# Patient Record
Sex: MALE | Race: WHITE | Employment: FULL TIME | URBAN - METROPOLITAN AREA
[De-identification: names, ages, dates, MRNs, and addresses within clinical notes are randomized per-mention and may not be internally consistent; named-entity substitution may affect disease eponyms.]

---

## 2017-08-29 DIAGNOSIS — K21.00 REFLUX ESOPHAGITIS: ICD-10-CM

## 2017-08-29 DIAGNOSIS — I10 ESSENTIAL HYPERTENSION WITH GOAL BLOOD PRESSURE LESS THAN 140/90: ICD-10-CM

## 2017-08-29 RX ORDER — OMEPRAZOLE 20 MG/1
20 CAPSULE, DELAYED RELEASE ORAL
Qty: 90 CAPSULE | Refills: 0 | Status: SHIPPED | OUTPATIENT
Start: 2017-08-29 | End: 2017-12-16

## 2017-08-29 RX ORDER — AMLODIPINE BESYLATE 10 MG/1
10 TABLET ORAL
Qty: 90 TABLET | Refills: 0 | Status: SHIPPED | OUTPATIENT
Start: 2017-08-29 | End: 2017-11-23

## 2017-08-29 NOTE — TELEPHONE ENCOUNTER
Medication(s) to Refill:   Pending Prescriptions Disp Refills    OMEPRAZOLE 20 MG Oral Capsule Delayed Release [Pharmacy Med Name: OMEPRAZOLE DR 20 MG CAPSULE] 90 capsule 2     Sig: TAKE 1 CAPSULE (20 MG TOTAL) BY MOUTH ONCE DAILY.       AMLODIPINE BESYLATE

## 2017-08-29 NOTE — TELEPHONE ENCOUNTER
Per pt last visit he had labs done through screening at work in 2016, said would fax us a copy. Did he do that. I did not see them scanned in chart.

## 2017-09-25 DIAGNOSIS — R09.82 POST-NASAL DRAINAGE: ICD-10-CM

## 2017-09-26 RX ORDER — MONTELUKAST SODIUM 10 MG/1
10 TABLET ORAL DAILY
Qty: 90 TABLET | Refills: 1 | Status: SHIPPED | OUTPATIENT
Start: 2017-09-26 | End: 2018-07-27 | Stop reason: ALTCHOICE

## 2017-10-25 ENCOUNTER — LAB ENCOUNTER (OUTPATIENT)
Dept: LAB | Age: 57
End: 2017-10-25
Attending: PHYSICIAN ASSISTANT
Payer: COMMERCIAL

## 2017-10-25 ENCOUNTER — OFFICE VISIT (OUTPATIENT)
Dept: INTERNAL MEDICINE CLINIC | Facility: CLINIC | Age: 57
End: 2017-10-25

## 2017-10-25 VITALS
RESPIRATION RATE: 17 BRPM | HEIGHT: 66 IN | BODY MASS INDEX: 36.48 KG/M2 | SYSTOLIC BLOOD PRESSURE: 124 MMHG | WEIGHT: 227 LBS | DIASTOLIC BLOOD PRESSURE: 68 MMHG | OXYGEN SATURATION: 98 % | HEART RATE: 72 BPM

## 2017-10-25 DIAGNOSIS — Z79.899 MEDICATION MANAGEMENT: ICD-10-CM

## 2017-10-25 DIAGNOSIS — Z12.5 PROSTATE CANCER SCREENING: ICD-10-CM

## 2017-10-25 DIAGNOSIS — R06.00 DYSPNEA, UNSPECIFIED TYPE: ICD-10-CM

## 2017-10-25 DIAGNOSIS — R97.20 ELEVATED PSA: ICD-10-CM

## 2017-10-25 DIAGNOSIS — I10 ESSENTIAL HYPERTENSION: ICD-10-CM

## 2017-10-25 DIAGNOSIS — K21.9 GASTROESOPHAGEAL REFLUX DISEASE, ESOPHAGITIS PRESENCE NOT SPECIFIED: ICD-10-CM

## 2017-10-25 DIAGNOSIS — Z00.00 ANNUAL PHYSICAL EXAM: Primary | ICD-10-CM

## 2017-10-25 DIAGNOSIS — Z13.0 SCREENING FOR BLOOD DISEASE: ICD-10-CM

## 2017-10-25 DIAGNOSIS — R53.83 MALAISE AND FATIGUE: ICD-10-CM

## 2017-10-25 DIAGNOSIS — R53.81 MALAISE AND FATIGUE: ICD-10-CM

## 2017-10-25 PROCEDURE — 99396 PREV VISIT EST AGE 40-64: CPT | Performed by: PHYSICIAN ASSISTANT

## 2017-10-25 PROCEDURE — G0103 PSA SCREENING: HCPCS | Performed by: PHYSICIAN ASSISTANT

## 2017-10-25 PROCEDURE — 85025 COMPLETE CBC W/AUTO DIFF WBC: CPT | Performed by: PHYSICIAN ASSISTANT

## 2017-10-25 PROCEDURE — 80053 COMPREHEN METABOLIC PANEL: CPT | Performed by: PHYSICIAN ASSISTANT

## 2017-10-25 NOTE — PROGRESS NOTES
Patient presents with:  Physical: breathing is heavy,.. RM 6       HPI:  Pt presents for annual physical.  He is c/p a feeling of \"heaviness to his breathing. \"  Presents every day and denies any relation to exertion.   Pt walks and even jogs at times and • Unspecified fall     acute rt biceps femoris muscle strain     Past Surgical History:  1/26/2011: COLONOSCOPY      Comment: Selma Restrepo MD  1/26/2011: ESOPHAGOSCOPY,BIOPSY      Comment: irregular z-line w/ 1 c proximal displacement                an present. Cardiovascular: Normal rate, regular rhythm and intact distal pulses. No murmur, rubs or gallops. Pulmonary/Chest: Effort normal and breath sounds normal. No respiratory distress. No wheezes, rhonchi or rales  Abdominal: Soft.  Bowel sounds ar

## 2017-11-23 DIAGNOSIS — I10 ESSENTIAL HYPERTENSION WITH GOAL BLOOD PRESSURE LESS THAN 140/90: ICD-10-CM

## 2017-11-27 RX ORDER — AMLODIPINE BESYLATE 10 MG/1
10 TABLET ORAL
Qty: 90 TABLET | Refills: 1 | Status: SHIPPED | OUTPATIENT
Start: 2017-11-27 | End: 2018-05-20

## 2017-12-16 DIAGNOSIS — K21.00 REFLUX ESOPHAGITIS: ICD-10-CM

## 2017-12-18 NOTE — TELEPHONE ENCOUNTER
Lov: 10/25/2017   Upcoming Appt: None   Last Labs: 10/31/2017 Lipids  Last Refill: 8/29/2017 , 90 caps, 0 refill    Route to provider per protocol.

## 2017-12-18 NOTE — TELEPHONE ENCOUNTER
Please call pt. We were going to change him to Pepcid OTC. Has he done that? Was this an auto refill?

## 2017-12-20 RX ORDER — OMEPRAZOLE 20 MG/1
20 CAPSULE, DELAYED RELEASE ORAL
Qty: 30 CAPSULE | Refills: 1 | Status: SHIPPED | OUTPATIENT
Start: 2017-12-20 | End: 2018-01-18

## 2017-12-20 NOTE — TELEPHONE ENCOUNTER
Patient stated he will try Pepcid OTC, however would like the Omeprazole refilled as back up for the case that Pepcid does not work out for him .

## 2018-01-18 DIAGNOSIS — K21.00 REFLUX ESOPHAGITIS: ICD-10-CM

## 2018-01-18 RX ORDER — OMEPRAZOLE 20 MG/1
20 CAPSULE, DELAYED RELEASE ORAL
Qty: 90 CAPSULE | Refills: 1 | Status: SHIPPED | OUTPATIENT
Start: 2018-01-18 | End: 2018-02-14

## 2018-01-18 NOTE — TELEPHONE ENCOUNTER
LOV: 10/25/17  Future office visit: No upcoming visit   Last labs: 10/25/17 Cmp Psa Cbc   Last RX: 12/2-0/17 #30 #1 Refill  Per protocol: Route to provider

## 2018-02-14 DIAGNOSIS — K21.00 REFLUX ESOPHAGITIS: ICD-10-CM

## 2018-02-14 RX ORDER — OMEPRAZOLE 20 MG/1
20 CAPSULE, DELAYED RELEASE ORAL
Qty: 30 CAPSULE | Refills: 1 | Status: SHIPPED | OUTPATIENT
Start: 2018-02-14 | End: 2018-07-27

## 2018-02-14 NOTE — TELEPHONE ENCOUNTER
LOV: 10/25/17  Future office visit: No upcoming visit  Last labs: 10/25/17 Cmp Psa Cbc  Last RX: Omeprazole 1/18/18 #90 #1 Refills  Per protocol: Route to provider

## 2018-05-20 DIAGNOSIS — I10 ESSENTIAL HYPERTENSION WITH GOAL BLOOD PRESSURE LESS THAN 140/90: ICD-10-CM

## 2018-05-21 RX ORDER — AMLODIPINE BESYLATE 10 MG/1
10 TABLET ORAL
Qty: 90 TABLET | Refills: 0 | Status: SHIPPED | OUTPATIENT
Start: 2018-05-21 | End: 2018-08-15

## 2018-06-15 NOTE — TELEPHONE ENCOUNTER
Future Appointments  Date Time Provider Aleyda Dowell   6/20/2018 1:00 PM Medhatb RENEE Orellana [facility] IM

## 2018-06-21 ENCOUNTER — OFFICE VISIT (OUTPATIENT)
Dept: INTERNAL MEDICINE CLINIC | Facility: CLINIC | Age: 58
End: 2018-06-21

## 2018-06-21 VITALS
RESPIRATION RATE: 14 BRPM | TEMPERATURE: 98 F | DIASTOLIC BLOOD PRESSURE: 74 MMHG | WEIGHT: 232 LBS | HEIGHT: 66 IN | HEART RATE: 68 BPM | SYSTOLIC BLOOD PRESSURE: 124 MMHG | BODY MASS INDEX: 37.28 KG/M2

## 2018-06-21 DIAGNOSIS — K21.00 GASTROESOPHAGEAL REFLUX DISEASE WITH ESOPHAGITIS: ICD-10-CM

## 2018-06-21 DIAGNOSIS — E66.9 OBESITY (BMI 30-39.9): ICD-10-CM

## 2018-06-21 DIAGNOSIS — R06.00 DYSPNEA, UNSPECIFIED TYPE: ICD-10-CM

## 2018-06-21 DIAGNOSIS — I10 ESSENTIAL HYPERTENSION WITH GOAL BLOOD PRESSURE LESS THAN 140/90: Primary | ICD-10-CM

## 2018-06-21 PROCEDURE — 99214 OFFICE O/P EST MOD 30 MIN: CPT | Performed by: PHYSICIAN ASSISTANT

## 2018-06-21 NOTE — PROGRESS NOTES
Patient presents with:  Medication Follow-Up: AB RM 6       HPI:  Pt presents for follow up of HTN, SON, GERD. GERD - Pt did not tolerate change to pepcid.   He has been taking Omeprazole only when needed, that has been about every other day as he is havin Physical Exam  /74   Pulse 68   Temp 97.8 °F (36.6 °C) (Oral)   Resp 14   Ht 66\"   Wt 232 lb   BMI 37.45 kg/m²   Constitutional:  No distress. HEENT:  Normocephalic and atraumatic.  Tympanic membranes normal.  Nose normal. Oropharynx is clear having.

## 2018-06-26 ENCOUNTER — TELEPHONE (OUTPATIENT)
Dept: INTERNAL MEDICINE CLINIC | Facility: CLINIC | Age: 58
End: 2018-06-26

## 2018-06-27 NOTE — TELEPHONE ENCOUNTER
Pt's EGD and pathology from 1/26/11 reviewed (from paper chart). Pathology DID NOT show Alvarenga's esophagus. Please inform pt. I would like him to follow up with GI if his GERD sxs are not completely controlled once back on Omeprazole.    If he has any q

## 2018-08-15 DIAGNOSIS — I10 ESSENTIAL HYPERTENSION WITH GOAL BLOOD PRESSURE LESS THAN 140/90: ICD-10-CM

## 2018-08-15 RX ORDER — AMLODIPINE BESYLATE 10 MG/1
TABLET ORAL
Qty: 90 TABLET | Refills: 0 | Status: SHIPPED | OUTPATIENT
Start: 2018-08-15 | End: 2018-11-11

## 2018-10-25 ENCOUNTER — OFFICE VISIT (OUTPATIENT)
Dept: INTERNAL MEDICINE CLINIC | Facility: CLINIC | Age: 58
End: 2018-10-25
Payer: COMMERCIAL

## 2018-10-25 VITALS
WEIGHT: 218.81 LBS | TEMPERATURE: 98 F | HEART RATE: 84 BPM | RESPIRATION RATE: 16 BRPM | BODY MASS INDEX: 37.36 KG/M2 | HEIGHT: 64 IN | SYSTOLIC BLOOD PRESSURE: 122 MMHG | DIASTOLIC BLOOD PRESSURE: 76 MMHG

## 2018-10-25 DIAGNOSIS — Z00.00 ANNUAL PHYSICAL EXAM: Primary | ICD-10-CM

## 2018-10-25 DIAGNOSIS — I10 ESSENTIAL HYPERTENSION WITH GOAL BLOOD PRESSURE LESS THAN 140/90: ICD-10-CM

## 2018-10-25 DIAGNOSIS — G47.9 SLEEP DIFFICULTIES: ICD-10-CM

## 2018-10-25 DIAGNOSIS — K21.00 GASTROESOPHAGEAL REFLUX DISEASE WITH ESOPHAGITIS: ICD-10-CM

## 2018-10-25 PROCEDURE — 99396 PREV VISIT EST AGE 40-64: CPT | Performed by: PHYSICIAN ASSISTANT

## 2018-10-25 NOTE — PROGRESS NOTES
Patient presents with:  Physical: cn room 6: physical no other concerns       HPI:  Pt presents for annual physical.  He denies any new problems today. Pt has seen a dietitian and working on healthier eating, less carbs and more exercise.   Reports he has Procedure Laterality Date   • COLONOSCOPY  1/26/2011    Dimple Arreola MD   • ESOPHAGOSCOPY,BIOPSY  01/26/2011    Final pathology reviewed (paper chart pulled) and NO ELIZABETH'S ESOPHAGUS; irregular z-line w/ 1 c proximal displacement and squamous islands icterus. Neck: Normal range of motion. Neck supple. No carotid bruits bilaterally. No edema present. Cardiovascular: Normal rate, regular rhythm and intact distal pulses. No murmur, rubs or gallops.    Pulmonary/Chest: Effort normal and breath sounds no 10/25/2017 15.3  13.0 - 17.0 g/dL Final   • HCT 10/25/2017 43.9  37.0 - 53.0 % Final   • PLT 10/25/2017 268.0  150.0 - 450.0 10(3)uL Final   • MCV 10/25/2017 89.6  80.0 - 99.0 fL Final   • MCH 10/25/2017 31.2  27.0 - 33.2 pg Final   • MCHC 10/25/2017 34.9 untreated JENNIFER. Pt still declining Sleep study. Agrees to reconsider if sleep does not continue to improve with TLC and wt loss. No orders of the defined types were placed in this encounter.       Meds & Refills for this Visit:  Requested Prescriptions

## 2018-11-11 DIAGNOSIS — I10 ESSENTIAL HYPERTENSION WITH GOAL BLOOD PRESSURE LESS THAN 140/90: ICD-10-CM

## 2018-11-12 RX ORDER — AMLODIPINE BESYLATE 10 MG/1
TABLET ORAL
Qty: 90 TABLET | Refills: 0 | Status: SHIPPED | OUTPATIENT
Start: 2018-11-12 | End: 2019-02-07

## 2018-11-12 NOTE — TELEPHONE ENCOUNTER
LFV: 10/25/2018 with CB  Future Appt: none on file  Last Rx: 8/15/2018 for 90 days 0 refills  Last Labs: 10/25/2017 cbc and cmp  Per protocol to provider

## 2018-11-15 ENCOUNTER — TELEPHONE (OUTPATIENT)
Dept: INTERNAL MEDICINE CLINIC | Facility: CLINIC | Age: 58
End: 2018-11-15

## 2018-11-15 DIAGNOSIS — I10 ESSENTIAL HYPERTENSION: Primary | ICD-10-CM

## 2018-11-15 DIAGNOSIS — Z13.0 SCREENING FOR BLOOD DISEASE: ICD-10-CM

## 2018-11-15 DIAGNOSIS — Z12.5 PROSTATE CANCER SCREENING: ICD-10-CM

## 2018-11-16 NOTE — TELEPHONE ENCOUNTER
He needs to have CBC, CMP and PSA done. I ordered them. Please verify with him what lab he will use and that the orders are correct to the right place. The labs that were in my box have already been scanned in to Epic, this was a duplicate copy.

## 2018-11-19 NOTE — TELEPHONE ENCOUNTER
Pt returned call re: lab results. Pt said he had labs done in 8/2018 - unable to locate, collected outside THE [facility]. Last labs scanned in are from 8/2017. Pt said he needs to call back and let us know which lab he wants to use.

## 2019-01-22 ENCOUNTER — APPOINTMENT (OUTPATIENT)
Dept: GENERAL RADIOLOGY | Facility: HOSPITAL | Age: 59
End: 2019-01-22
Attending: EMERGENCY MEDICINE
Payer: COMMERCIAL

## 2019-01-22 ENCOUNTER — HOSPITAL ENCOUNTER (EMERGENCY)
Facility: HOSPITAL | Age: 59
Discharge: HOME | End: 2019-01-22
Attending: EMERGENCY MEDICINE
Payer: COMMERCIAL

## 2019-01-22 VITALS
WEIGHT: 200 LBS | DIASTOLIC BLOOD PRESSURE: 65 MMHG | TEMPERATURE: 99 F | BODY MASS INDEX: 33.32 KG/M2 | HEIGHT: 65 IN | HEART RATE: 64 BPM | SYSTOLIC BLOOD PRESSURE: 113 MMHG | RESPIRATION RATE: 18 BRPM | OXYGEN SATURATION: 96 %

## 2019-01-22 DIAGNOSIS — M23.92 INTERNAL DERANGEMENT OF LEFT KNEE: Primary | ICD-10-CM

## 2019-01-22 PROBLEM — M17.12 PRIMARY OSTEOARTHRITIS OF LEFT KNEE: Status: ACTIVE | Noted: 2019-01-22

## 2019-01-22 PROCEDURE — 73562 X-RAY EXAM OF KNEE 3: CPT | Performed by: EMERGENCY MEDICINE

## 2019-01-22 PROCEDURE — 99284 EMERGENCY DEPT VISIT MOD MDM: CPT

## 2019-01-22 PROCEDURE — 99283 EMERGENCY DEPT VISIT LOW MDM: CPT

## 2019-01-22 NOTE — ED INITIAL ASSESSMENT (HPI)
Arrives with c/o left leg pain for the last 4-5 days. No injury. No recent long car or plane trips. States pain increased to the point that was unable to bear weight when attempting to go to work this morning.

## 2019-01-22 NOTE — ED PROVIDER NOTES
Patient Seen in: [facility] Emergency Department    History   Patient presents with:  Lower Extremity Injury (musculoskeletal)    Stated Complaint: left leg pain for 3-4 days.     HPI    Jamaal Orta is a 49-year-old gentleman presenting to the emergency Exam  Alert and oriented patient appears no distress HEENT exam is normal lungs are clear cardiovascular exam shows regular rhythm abdomen soft nontender symmetrical cyanosis edema left lower extremity small joint effusion no patellar apprehension no laxit

## 2019-02-07 ENCOUNTER — TELEPHONE (OUTPATIENT)
Dept: INTERNAL MEDICINE CLINIC | Facility: CLINIC | Age: 59
End: 2019-02-07

## 2019-02-07 DIAGNOSIS — I10 ESSENTIAL HYPERTENSION WITH GOAL BLOOD PRESSURE LESS THAN 140/90: ICD-10-CM

## 2019-02-07 RX ORDER — AMLODIPINE BESYLATE 10 MG/1
TABLET ORAL
Qty: 30 TABLET | Refills: 0 | Status: SHIPPED | OUTPATIENT
Start: 2019-02-07 | End: 2019-03-20

## 2019-02-08 NOTE — TELEPHONE ENCOUNTER
I refilled for 30 days as I cannot find any labs since 2017. Please remind him to do labs we ordered in November.

## 2019-03-20 ENCOUNTER — TELEPHONE (OUTPATIENT)
Dept: INTERNAL MEDICINE CLINIC | Facility: CLINIC | Age: 59
End: 2019-03-20

## 2019-03-20 DIAGNOSIS — Z13.0 SCREENING FOR DISORDER OF BLOOD AND BLOOD-FORMING ORGANS: ICD-10-CM

## 2019-03-20 DIAGNOSIS — I10 ESSENTIAL HYPERTENSION: Primary | ICD-10-CM

## 2019-03-20 LAB
ABSOLUTE BASOPHILS: 94 CELLS/UL (ref 0–200)
ABSOLUTE EOSINOPHILS: 51 CELLS/UL (ref 15–500)
ABSOLUTE LYMPHOCYTES: 2491 CELLS/UL (ref 850–3900)
ABSOLUTE MONOCYTES: 714 CELLS/UL (ref 200–950)
ABSOLUTE NEUTROPHILS: 5151 CELLS/UL (ref 1500–7800)
ALBUMIN/GLOBULIN RATIO: 1.3 (CALC) (ref 1–2.5)
ALBUMIN: 4 G/DL (ref 3.6–5.1)
ALKALINE PHOSPHATASE: 59 U/L (ref 40–115)
ALT: 14 U/L (ref 9–46)
AST: 16 U/L (ref 10–35)
BASOPHILS: 1.1 %
BILIRUBIN, TOTAL: 0.5 MG/DL (ref 0.2–1.2)
BUN: 18 MG/DL (ref 7–25)
CALCIUM: 9.1 MG/DL (ref 8.6–10.3)
CARBON DIOXIDE: 29 MMOL/L (ref 20–32)
CHLORIDE: 100 MMOL/L (ref 98–110)
CREATININE: 0.96 MG/DL (ref 0.7–1.25)
EGFR IF AFRICN AM: 98 ML/MIN/1.73M2
EGFR IF NONAFRICN AM: 85 ML/MIN/1.73M2
EOSINOPHILS: 0.6 %
GLOBULIN: 3 G/DL (CALC) (ref 1.9–3.7)
GLUCOSE: 99 MG/DL (ref 65–99)
HEMATOCRIT: 44.8 % (ref 38.5–50)
HEMOGLOBIN: 15.4 G/DL (ref 13.2–17.1)
LYMPHOCYTES: 29.3 %
MCH: 30.3 PG (ref 27–33)
MCHC: 34.4 G/DL (ref 32–36)
MCV: 88.2 FL (ref 80–100)
MONOCYTES: 8.4 %
MPV: 9.9 FL (ref 7.5–12.5)
NEUTROPHILS: 60.6 %
PLATELET COUNT: 314 THOUSAND/UL (ref 140–400)
POTASSIUM: 4.4 MMOL/L (ref 3.5–5.3)
PROTEIN, TOTAL: 7 G/DL (ref 6.1–8.1)
RDW: 12.7 % (ref 11–15)
RED BLOOD CELL COUNT: 5.08 MILLION/UL (ref 4.2–5.8)
SODIUM: 137 MMOL/L (ref 135–146)
WHITE BLOOD CELL COUNT: 8.5 THOUSAND/UL (ref 3.8–10.8)

## 2019-03-20 RX ORDER — AMLODIPINE BESYLATE 10 MG/1
10 TABLET ORAL
Qty: 90 TABLET | Refills: 0 | Status: SHIPPED | OUTPATIENT
Start: 2019-03-20 | End: 2019-06-10

## 2019-03-20 NOTE — TELEPHONE ENCOUNTER
Pt on his way to do his labs at [address] [place] [address]. Is asking if the refill can be done for 90 pls?

## 2019-04-23 PROBLEM — R60.9 PITTING EDEMA: Status: ACTIVE | Noted: 2019-04-23

## 2019-04-23 PROBLEM — M79.89 CALF SWELLING: Status: ACTIVE | Noted: 2019-04-23

## 2019-04-23 PROBLEM — M23.92 INTERNAL DERANGEMENT OF KNEE, LEFT: Status: ACTIVE | Noted: 2019-04-23

## 2019-04-23 PROBLEM — M25.462 EFFUSION OF KNEE JOINT, LEFT: Status: ACTIVE | Noted: 2019-04-23

## 2019-05-14 PROBLEM — S83.242A ACUTE MEDIAL MENISCUS TEAR OF LEFT KNEE: Status: ACTIVE | Noted: 2019-05-14

## 2019-06-10 DIAGNOSIS — I10 ESSENTIAL HYPERTENSION WITH GOAL BLOOD PRESSURE LESS THAN 140/90: ICD-10-CM

## 2019-06-10 RX ORDER — AMLODIPINE BESYLATE 10 MG/1
TABLET ORAL
Qty: 90 TABLET | Refills: 0 | Status: SHIPPED | OUTPATIENT
Start: 2019-06-10 | End: 2019-09-05

## 2019-06-10 NOTE — TELEPHONE ENCOUNTER
Protocol failed due to Appointment in past 6 or next 3 months    Please advise,    LOV:10/25/18 CB  FOV:10/30/19   LAST RX: 3/20/19 10 mg take 1 tab daily 90 tabs 0 refills   LAST LABS:3/20/19 cmp,cbc  PER PROTOCOL: to provider

## 2019-06-12 NOTE — TELEPHONE ENCOUNTER
Future Appointments   Date Time Provider Aleyda Dowell   10/30/2019  7:30 AM Citlali Cutler PA-C [facility] IM

## 2019-09-05 DIAGNOSIS — I10 ESSENTIAL HYPERTENSION WITH GOAL BLOOD PRESSURE LESS THAN 140/90: ICD-10-CM

## 2019-09-05 RX ORDER — AMLODIPINE BESYLATE 10 MG/1
TABLET ORAL
Qty: 90 TABLET | Refills: 0 | Status: SHIPPED | OUTPATIENT
Start: 2019-09-05 | End: 2019-12-03

## 2019-09-05 NOTE — TELEPHONE ENCOUNTER
Last Ov: 10/25/18, CB, physical  Upcoming appt: 10/30/19  Last labs: CBC, CMP 3/20/19  Last Rx: amlodipine 10mg, #90, 0R 6/10/19    Per Protocol, passed.  Refill sent

## 2019-10-30 ENCOUNTER — OFFICE VISIT (OUTPATIENT)
Dept: INTERNAL MEDICINE CLINIC | Facility: CLINIC | Age: 59
End: 2019-10-30
Payer: COMMERCIAL

## 2019-10-30 VITALS
HEART RATE: 68 BPM | TEMPERATURE: 99 F | RESPIRATION RATE: 16 BRPM | HEIGHT: 64 IN | SYSTOLIC BLOOD PRESSURE: 122 MMHG | WEIGHT: 206 LBS | OXYGEN SATURATION: 100 % | DIASTOLIC BLOOD PRESSURE: 74 MMHG | BODY MASS INDEX: 35.17 KG/M2

## 2019-10-30 DIAGNOSIS — E66.9 CLASS 2 OBESITY WITHOUT SERIOUS COMORBIDITY WITH BODY MASS INDEX (BMI) OF 35.0 TO 35.9 IN ADULT, UNSPECIFIED OBESITY TYPE: ICD-10-CM

## 2019-10-30 DIAGNOSIS — Z13.0 SCREENING FOR BLOOD DISEASE: ICD-10-CM

## 2019-10-30 DIAGNOSIS — Z13.29 THYROID DISORDER SCREEN: ICD-10-CM

## 2019-10-30 DIAGNOSIS — Z13.220 LIPID SCREENING: ICD-10-CM

## 2019-10-30 DIAGNOSIS — I10 ESSENTIAL HYPERTENSION WITH GOAL BLOOD PRESSURE LESS THAN 140/90: ICD-10-CM

## 2019-10-30 DIAGNOSIS — Z12.5 PROSTATE CANCER SCREENING: ICD-10-CM

## 2019-10-30 DIAGNOSIS — Z00.00 ANNUAL PHYSICAL EXAM: Primary | ICD-10-CM

## 2019-10-30 PROCEDURE — 99396 PREV VISIT EST AGE 40-64: CPT | Performed by: PHYSICIAN ASSISTANT

## 2019-10-30 NOTE — PROGRESS NOTES
Patient presents with:  Physical: RG rm 6 Physical      HPI:   Pt presents for annual physical.  He denies any new problems. Has kept the weight he lost off but has not lost any more. Has had L knee problems over the last year. Seeing Xochitl.   Consider History:   Procedure Laterality Date   • COLONOSCOPY  1/26/2011    Erna Valencia MD   • ESOPHAGOSCOPY,BIOPSY  01/26/2011    Final pathology reviewed (paper chart pulled) and NO ELIZABETH'S ESOPHAGUS; irregular z-line w/ 1 c proximal displacement and squamou Normal range of motion. Neck supple. No carotid bruits bilaterally. No edema present. Cardiovascular: Normal rate, regular rhythm and intact distal pulses. No murmur, rubs or gallops.    Pulmonary/Chest: Effort normal and breath sounds normal. No respirat

## 2019-11-16 ENCOUNTER — LAB ENCOUNTER (OUTPATIENT)
Dept: LAB | Age: 59
End: 2019-11-16
Attending: PHYSICIAN ASSISTANT
Payer: COMMERCIAL

## 2019-11-16 DIAGNOSIS — Z00.00 ANNUAL PHYSICAL EXAM: ICD-10-CM

## 2019-11-16 DIAGNOSIS — Z12.5 PROSTATE CANCER SCREENING: ICD-10-CM

## 2019-11-16 PROCEDURE — 84153 ASSAY OF PSA TOTAL: CPT | Performed by: PHYSICIAN ASSISTANT

## 2019-11-18 DIAGNOSIS — R73.9 HYPERGLYCEMIA: Primary | ICD-10-CM

## 2019-11-18 NOTE — PROGRESS NOTES
Normal A1c.   Please notify pt that though fasting blood sugar was mildly elevated his 3 month average blood sugar was normal.

## 2019-11-18 NOTE — PROGRESS NOTES
Labs are normal/stable except for elevated glucose. Please call and add HBA1c to blood in lab. I placed the order.

## 2019-12-03 DIAGNOSIS — I10 ESSENTIAL HYPERTENSION WITH GOAL BLOOD PRESSURE LESS THAN 140/90: ICD-10-CM

## 2019-12-03 RX ORDER — AMLODIPINE BESYLATE 10 MG/1
TABLET ORAL
Qty: 90 TABLET | Refills: 1 | Status: SHIPPED | OUTPATIENT
Start: 2019-12-03 | End: 2020-06-16

## 2020-06-16 DIAGNOSIS — I10 ESSENTIAL HYPERTENSION WITH GOAL BLOOD PRESSURE LESS THAN 140/90: ICD-10-CM

## 2020-06-16 RX ORDER — AMLODIPINE BESYLATE 10 MG/1
TABLET ORAL
Qty: 90 TABLET | Refills: 0 | Status: SHIPPED | OUTPATIENT
Start: 2020-06-16 | End: 2020-09-10

## 2020-06-16 NOTE — TELEPHONE ENCOUNTER
Last Ov: 10/30/19, CB, CPE  Last labs: A1c, PSA screen, TSH w Ref, Lipid, CMP, CBC 11/16/19  Last Rx: amlodipine 10mg, #90, 1R 12/3/19    No future appointments. Per Protocol - failed, pt due for appt. Rx pending.

## 2020-06-16 NOTE — TELEPHONE ENCOUNTER
Pt due for f/u of HTN. Please call to schedule. Can do phone visit if he has cuff to check BP at home.   Thx.

## 2020-06-24 NOTE — TELEPHONE ENCOUNTER
Future Appointments   Date Time Provider Aleyda Dowell   6/25/2020  3:00 PM Marylou Moise PA-C [facility]

## 2020-06-25 ENCOUNTER — VIRTUAL PHONE E/M (OUTPATIENT)
Dept: INTERNAL MEDICINE CLINIC | Facility: CLINIC | Age: 60
End: 2020-06-25
Payer: COMMERCIAL

## 2020-06-25 VITALS — DIASTOLIC BLOOD PRESSURE: 63 MMHG | SYSTOLIC BLOOD PRESSURE: 122 MMHG

## 2020-06-25 DIAGNOSIS — I10 ESSENTIAL HYPERTENSION WITH GOAL BLOOD PRESSURE LESS THAN 140/90: Primary | ICD-10-CM

## 2020-06-25 PROCEDURE — 99213 OFFICE O/P EST LOW 20 MIN: CPT | Performed by: PHYSICIAN ASSISTANT

## 2020-09-10 DIAGNOSIS — I10 ESSENTIAL HYPERTENSION WITH GOAL BLOOD PRESSURE LESS THAN 140/90: ICD-10-CM

## 2020-09-10 RX ORDER — AMLODIPINE BESYLATE 10 MG/1
TABLET ORAL
Qty: 90 TABLET | Refills: 0 | Status: SHIPPED | OUTPATIENT
Start: 2020-09-10 | End: 2020-12-09

## 2020-09-10 NOTE — TELEPHONE ENCOUNTER
Last Ov: 6/25/20, CB, virtual visit (BP)  Last labs: A1c, PSA, TSH w Ref, Lipid, CMP, CBC 11/16/19  Last Rx: amlodipine 10mg, #90, 0R 6/16/20    No future appointments. Per Protocol - failed, pt due for appt. Rx pending.

## 2020-12-09 DIAGNOSIS — I10 ESSENTIAL HYPERTENSION WITH GOAL BLOOD PRESSURE LESS THAN 140/90: ICD-10-CM

## 2020-12-09 RX ORDER — AMLODIPINE BESYLATE 10 MG/1
TABLET ORAL
Qty: 30 TABLET | Refills: 0 | Status: SHIPPED | OUTPATIENT
Start: 2020-12-09 | End: 2020-12-17

## 2020-12-09 NOTE — TELEPHONE ENCOUNTER
Last VISIT 10/30/19    Last REFILL 09/10/20 qty 90 w/0 refills    Last LABS No recent labs done    No future appointments. Per PROTOCOL? Failed    Please Approve or Deny.

## 2020-12-17 ENCOUNTER — TELEPHONE (OUTPATIENT)
Dept: INTERNAL MEDICINE CLINIC | Facility: CLINIC | Age: 60
End: 2020-12-17

## 2020-12-17 DIAGNOSIS — Z12.5 PROSTATE CANCER SCREENING: ICD-10-CM

## 2020-12-17 DIAGNOSIS — Z00.00 ANNUAL PHYSICAL EXAM: Primary | ICD-10-CM

## 2020-12-17 DIAGNOSIS — I10 ESSENTIAL HYPERTENSION WITH GOAL BLOOD PRESSURE LESS THAN 140/90: ICD-10-CM

## 2020-12-17 DIAGNOSIS — Z13.0 ENCOUNTER FOR SCREENING FOR DISEASES OF THE BLOOD AND BLOOD-FORMING ORGANS AND CERTAIN DISORDERS INVOLVING THE IMMUNE MECHANISM: ICD-10-CM

## 2020-12-17 DIAGNOSIS — Z13.1 SCREENING FOR DIABETES MELLITUS: ICD-10-CM

## 2020-12-17 DIAGNOSIS — Z13.0 SCREENING FOR BLOOD DISEASE: ICD-10-CM

## 2020-12-17 DIAGNOSIS — Z13.29 THYROID DISORDER SCREEN: ICD-10-CM

## 2020-12-17 DIAGNOSIS — Z13.220 LIPID SCREENING: ICD-10-CM

## 2020-12-17 NOTE — TELEPHONE ENCOUNTER
Future Appointments   Date Time Provider Aleyda Magalys   1/6/2021  1:30 PM Sapphire Alegria PA-C [facility]

## 2020-12-17 NOTE — TELEPHONE ENCOUNTER
Future Appointments   Date Time Provider Aleyda Magalys   1/6/2021  1:30 PM Bruce Izaguirre PA-C [facility]     Patient is scheduled for Annual Physical.  Please place orders with THE [facility] [place] [facility]. Patient aware to fast.  No call back required.   Henrietta

## 2020-12-17 NOTE — TELEPHONE ENCOUNTER
Prescription Refill Request - Patient advised can take 48-72 hours.     Name of Medication (strength, dose, qty requested:     AMLODIPINE BESYLATE 10 MG Oral Tab 30 tablet 0 12/9/2020    Sig:   TAKE 1 TABLET BY MOUTH EVERY DAY     Route:   (none)         Wh

## 2020-12-18 RX ORDER — AMLODIPINE BESYLATE 10 MG/1
10 TABLET ORAL DAILY
Qty: 30 TABLET | Refills: 0 | Status: SHIPPED | OUTPATIENT
Start: 2020-12-18 | End: 2020-12-21

## 2020-12-18 NOTE — TELEPHONE ENCOUNTER
Last VISIT  10/30/19 CB CPE  Last REFILL 12/8/20 Amlodipine 30T 0R  Last LABS  11/16/19 HgA1c, PSA, TSH, Lipid, CMP,    CBC    Future Appointments   Date Time Provider Aleyda Dowell   1/6/2021  1:30 PM Sapphire Alegria PA-C [facility]

## 2020-12-20 DIAGNOSIS — I10 ESSENTIAL HYPERTENSION WITH GOAL BLOOD PRESSURE LESS THAN 140/90: ICD-10-CM

## 2020-12-21 RX ORDER — AMLODIPINE BESYLATE 10 MG/1
TABLET ORAL
Qty: 90 TABLET | Refills: 0 | Status: SHIPPED | OUTPATIENT
Start: 2020-12-21 | End: 2021-02-01

## 2020-12-21 NOTE — TELEPHONE ENCOUNTER
Last Ov: 6/25/20, CB, virtual visit (HTN)  Last labs: CBC, CMP, Lipid, TSH w Ref, PSA, A1c 11/16/19  Last Rx: amlodipine 10mg, #30, 0R 12/18/20    Future Appointments   Date Time Provider Aleyda Dowell   1/6/2021  1:30 PM Emre Mack PA-C [facility]

## 2021-01-06 ENCOUNTER — LAB ENCOUNTER (OUTPATIENT)
Dept: LAB | Age: 61
End: 2021-01-06
Attending: PHYSICIAN ASSISTANT
Payer: COMMERCIAL

## 2021-01-06 DIAGNOSIS — R73.9 HYPERGLYCEMIA: ICD-10-CM

## 2021-01-06 DIAGNOSIS — Z00.00 ANNUAL PHYSICAL EXAM: ICD-10-CM

## 2021-01-06 DIAGNOSIS — R73.9 HYPERGLYCEMIA: Primary | ICD-10-CM

## 2021-01-06 DIAGNOSIS — I10 ESSENTIAL HYPERTENSION WITH GOAL BLOOD PRESSURE LESS THAN 140/90: ICD-10-CM

## 2021-01-06 DIAGNOSIS — Z13.220 LIPID SCREENING: ICD-10-CM

## 2021-01-06 DIAGNOSIS — Z13.0 SCREENING FOR BLOOD DISEASE: ICD-10-CM

## 2021-01-06 DIAGNOSIS — Z13.29 THYROID DISORDER SCREEN: ICD-10-CM

## 2021-01-06 DIAGNOSIS — Z12.5 PROSTATE CANCER SCREENING: ICD-10-CM

## 2021-01-06 LAB
ALBUMIN SERPL-MCNC: 3.5 G/DL (ref 3.4–5)
ALBUMIN/GLOB SERPL: 0.9 {RATIO} (ref 1–2)
ALP LIVER SERPL-CCNC: 62 U/L
ALT SERPL-CCNC: 23 U/L
ANION GAP SERPL CALC-SCNC: 8 MMOL/L (ref 0–18)
AST SERPL-CCNC: 12 U/L (ref 15–37)
BASOPHILS # BLD AUTO: 0.1 X10(3) UL (ref 0–0.2)
BASOPHILS NFR BLD AUTO: 1.3 %
BILIRUB SERPL-MCNC: 0.6 MG/DL (ref 0.1–2)
BUN BLD-MCNC: 17 MG/DL (ref 7–18)
BUN/CREAT SERPL: 19.1 (ref 10–20)
CALCIUM BLD-MCNC: 8.9 MG/DL (ref 8.5–10.1)
CHLORIDE SERPL-SCNC: 105 MMOL/L (ref 98–112)
CHOLEST SMN-MCNC: 221 MG/DL (ref ?–200)
CO2 SERPL-SCNC: 24 MMOL/L (ref 21–32)
COMPLEXED PSA SERPL-MCNC: 1.59 NG/ML (ref ?–4)
CREAT BLD-MCNC: 0.89 MG/DL
DEPRECATED RDW RBC AUTO: 43.4 FL (ref 35.1–46.3)
EOSINOPHIL # BLD AUTO: 0.17 X10(3) UL (ref 0–0.7)
EOSINOPHIL NFR BLD AUTO: 2.1 %
ERYTHROCYTE [DISTWIDTH] IN BLOOD BY AUTOMATED COUNT: 12.9 % (ref 11–15)
EST. AVERAGE GLUCOSE BLD GHB EST-MCNC: 114 MG/DL (ref 68–126)
GLOBULIN PLAS-MCNC: 3.7 G/DL (ref 2.8–4.4)
GLUCOSE BLD-MCNC: 102 MG/DL (ref 70–99)
HBA1C MFR BLD HPLC: 5.6 % (ref ?–5.7)
HCT VFR BLD AUTO: 47 %
HDLC SERPL-MCNC: 77 MG/DL (ref 40–59)
HGB BLD-MCNC: 15.5 G/DL
IMM GRANULOCYTES # BLD AUTO: 0.06 X10(3) UL (ref 0–1)
IMM GRANULOCYTES NFR BLD: 0.8 %
LDLC SERPL CALC-MCNC: 126 MG/DL (ref ?–100)
LYMPHOCYTES # BLD AUTO: 2.88 X10(3) UL (ref 1–4)
LYMPHOCYTES NFR BLD AUTO: 36.4 %
M PROTEIN MFR SERPL ELPH: 7.2 G/DL (ref 6.4–8.2)
MCH RBC QN AUTO: 30.5 PG (ref 26–34)
MCHC RBC AUTO-ENTMCNC: 33 G/DL (ref 31–37)
MCV RBC AUTO: 92.5 FL
MONOCYTES # BLD AUTO: 0.69 X10(3) UL (ref 0.1–1)
MONOCYTES NFR BLD AUTO: 8.7 %
NEUTROPHILS # BLD AUTO: 4.02 X10 (3) UL (ref 1.5–7.7)
NEUTROPHILS # BLD AUTO: 4.02 X10(3) UL (ref 1.5–7.7)
NEUTROPHILS NFR BLD AUTO: 50.7 %
NONHDLC SERPL-MCNC: 144 MG/DL (ref ?–130)
OSMOLALITY SERPL CALC.SUM OF ELEC: 286 MOSM/KG (ref 275–295)
PATIENT FASTING Y/N/NP: YES
PATIENT FASTING Y/N/NP: YES
PLATELET # BLD AUTO: 301 10(3)UL (ref 150–450)
POTASSIUM SERPL-SCNC: 4.4 MMOL/L (ref 3.5–5.1)
RBC # BLD AUTO: 5.08 X10(6)UL
SODIUM SERPL-SCNC: 137 MMOL/L (ref 136–145)
TRIGL SERPL-MCNC: 88 MG/DL (ref 30–149)
TSI SER-ACNC: 1.22 MIU/ML (ref 0.36–3.74)
VLDLC SERPL CALC-MCNC: 18 MG/DL (ref 0–30)
WBC # BLD AUTO: 7.9 X10(3) UL (ref 4–11)

## 2021-01-06 PROCEDURE — 36415 COLL VENOUS BLD VENIPUNCTURE: CPT

## 2021-01-06 PROCEDURE — 85025 COMPLETE CBC W/AUTO DIFF WBC: CPT

## 2021-01-06 PROCEDURE — 80061 LIPID PANEL: CPT

## 2021-01-06 PROCEDURE — 84443 ASSAY THYROID STIM HORMONE: CPT

## 2021-01-06 PROCEDURE — 83036 HEMOGLOBIN GLYCOSYLATED A1C: CPT

## 2021-01-06 PROCEDURE — 80053 COMPREHEN METABOLIC PANEL: CPT

## 2021-01-06 NOTE — PROGRESS NOTES
Labs noted. LDL increasing, gluc mildly elevated. A1c added to blood in lab. Will discuss results with pt at OV next week.

## 2021-01-07 NOTE — PROGRESS NOTES
Normal A1c in setting of elevated fasting glucose. Will discuss with pt at upcoming [address]

## 2021-01-14 ENCOUNTER — OFFICE VISIT (OUTPATIENT)
Dept: INTERNAL MEDICINE CLINIC | Facility: CLINIC | Age: 61
End: 2021-01-14
Payer: COMMERCIAL

## 2021-01-14 ENCOUNTER — LAB ENCOUNTER (OUTPATIENT)
Dept: LAB | Age: 61
End: 2021-01-14
Attending: PHYSICIAN ASSISTANT
Payer: COMMERCIAL

## 2021-01-14 VITALS
SYSTOLIC BLOOD PRESSURE: 134 MMHG | RESPIRATION RATE: 16 BRPM | HEART RATE: 72 BPM | HEIGHT: 64 IN | DIASTOLIC BLOOD PRESSURE: 76 MMHG | WEIGHT: 247 LBS | TEMPERATURE: 98 F | BODY MASS INDEX: 42.17 KG/M2

## 2021-01-14 DIAGNOSIS — Z12.11 COLON CANCER SCREENING: ICD-10-CM

## 2021-01-14 DIAGNOSIS — Z00.00 ANNUAL PHYSICAL EXAM: Primary | ICD-10-CM

## 2021-01-14 DIAGNOSIS — Z23 NEEDS FLU SHOT: ICD-10-CM

## 2021-01-14 DIAGNOSIS — R20.2 PARESTHESIAS: ICD-10-CM

## 2021-01-14 DIAGNOSIS — E78.9 ABNORMAL CHOLESTEROL TEST: ICD-10-CM

## 2021-01-14 DIAGNOSIS — Z23 NEED FOR SHINGLES VACCINE: ICD-10-CM

## 2021-01-14 LAB — VIT B12 SERPL-MCNC: 298 PG/ML (ref 193–986)

## 2021-01-14 PROCEDURE — 3075F SYST BP GE 130 - 139MM HG: CPT | Performed by: PHYSICIAN ASSISTANT

## 2021-01-14 PROCEDURE — 82607 VITAMIN B-12: CPT

## 2021-01-14 PROCEDURE — 90471 IMMUNIZATION ADMIN: CPT | Performed by: PHYSICIAN ASSISTANT

## 2021-01-14 PROCEDURE — 3078F DIAST BP <80 MM HG: CPT | Performed by: PHYSICIAN ASSISTANT

## 2021-01-14 PROCEDURE — 90750 HZV VACC RECOMBINANT IM: CPT | Performed by: PHYSICIAN ASSISTANT

## 2021-01-14 PROCEDURE — 3008F BODY MASS INDEX DOCD: CPT | Performed by: PHYSICIAN ASSISTANT

## 2021-01-14 PROCEDURE — 36415 COLL VENOUS BLD VENIPUNCTURE: CPT

## 2021-01-14 PROCEDURE — 99396 PREV VISIT EST AGE 40-64: CPT | Performed by: PHYSICIAN ASSISTANT

## 2021-01-14 NOTE — PROGRESS NOTES
Patient presents with:  Physical: DD Rm 1, Annual Physical  Vaccinations: Flu shot today      HPI:  Pt presents for annual physical.    Has numbness in L 5th digit X 3 1/2 mos. No inciting event.   Has numbness in R 2nd and 3rd fingers as well but more int medial meniscus tear of left knee     Essential hypertension with goal blood pressure less than 140/90      Past Medical History:   Diagnosis Date   • Unspecified fall     acute rt biceps femoris muscle strain   • Wears glasses      Past Surgical History: Position: Sitting, Cuff Size: large)   Pulse 72   Temp 98.2 °F (36.8 °C) (Temporal)   Resp 16   Ht 5' 4\" (1.626 m)   Wt 247 lb (112 kg)   BMI 42.40 kg/m²   Constitutional: Oriented to person, place, and time. No distress.    HEENT:  Normocephalic and atrau 10.1 mg/dL Final   • Calculated Osmolality 01/06/2021 286  275 - 295 mOsm/kg Final   • GFR, Non- 01/06/2021 92  >=60 Final   • GFR, -American 01/06/2021 106  >=60 Final   • AST 01/06/2021 12* 15 - 37 U/L Final   • ALT 01/06/2021 23 Absolute 01/06/2021 0.10  0.00 - 0.20 x10(3) uL Final   • Immature Granulocyte Absolute 01/06/2021 0.06  0.00 - 1.00 x10(3) uL Final   • Neutrophil % 01/06/2021 50.7  % Final   • Lymphocyte % 01/06/2021 36.4  % Final   • Monocyte % 01/06/2021 8.7  % Final

## 2021-01-15 NOTE — PROGRESS NOTES
B12 on low side of normal.  Would suggest pt start oral B12 supplement 1000 mcg PO daily. Get EMG/NCS as ordered at today's OV.

## 2021-02-01 DIAGNOSIS — I10 ESSENTIAL HYPERTENSION WITH GOAL BLOOD PRESSURE LESS THAN 140/90: ICD-10-CM

## 2021-02-01 RX ORDER — AMLODIPINE BESYLATE 10 MG/1
10 TABLET ORAL DAILY
Qty: 90 TABLET | Refills: 1 | Status: SHIPPED | OUTPATIENT
Start: 2021-02-01 | End: 2021-03-11

## 2021-02-12 ENCOUNTER — TELEPHONE (OUTPATIENT)
Dept: INTERNAL MEDICINE CLINIC | Facility: CLINIC | Age: 61
End: 2021-02-12

## 2021-02-12 DIAGNOSIS — K21.9 GASTROESOPHAGEAL REFLUX DISEASE, UNSPECIFIED WHETHER ESOPHAGITIS PRESENT: Primary | ICD-10-CM

## 2021-02-12 NOTE — TELEPHONE ENCOUNTER
SCOOBY    Pt reports has hx of GERD and heartburn, takes omeprazole. Requesting endoscopy. Informed can talk to  about this at first appt. Verbs understanding.

## 2021-02-12 NOTE — TELEPHONE ENCOUNTER
CB referred pt for colonoscopy to Dr. Bhatt Alert and pt wants to have endoscopy done at the same time-wants us to change the order to include endo please-call to advise

## 2021-02-12 NOTE — TELEPHONE ENCOUNTER
Pt was referred for screening colonoscopy, usually pt does not meet with GI for OV prior to colonoscopy. I will place another referral to [facility] for GERD.

## 2021-02-12 NOTE — TELEPHONE ENCOUNTER
Patient notified to schedule with Dr Jimenez Handing to discuss GERD and screening colonoscopy. Pt verbalizes understanding.

## 2021-03-11 RX ORDER — AMLODIPINE BESYLATE 10 MG/1
10 TABLET ORAL DAILY
Qty: 90 TABLET | Refills: 1 | Status: SHIPPED | OUTPATIENT
Start: 2021-03-11 | End: 2021-10-08

## 2021-03-17 ENCOUNTER — OFFICE VISIT (OUTPATIENT)
Dept: ELECTROPHYSIOLOGY | Facility: HOSPITAL | Age: 61
End: 2021-03-17
Attending: PHYSICIAN ASSISTANT
Payer: COMMERCIAL

## 2021-03-17 DIAGNOSIS — R20.2 PARESTHESIAS: ICD-10-CM

## 2021-03-17 DIAGNOSIS — R20.2 NUMBNESS AND TINGLING IN BOTH HANDS: Primary | ICD-10-CM

## 2021-03-17 DIAGNOSIS — R20.0 NUMBNESS AND TINGLING IN BOTH HANDS: Primary | ICD-10-CM

## 2021-03-17 PROCEDURE — 95886 MUSC TEST DONE W/N TEST COMP: CPT | Performed by: OTHER

## 2021-03-17 PROCEDURE — 95910 NRV CNDJ TEST 7-8 STUDIES: CPT | Performed by: OTHER

## 2021-03-18 NOTE — PROCEDURES
[facility], [address] [place] [address]      PATIENT'S NAME: Ant Laboy   REFERRING PHYSICIAN: Lenward Epley   PATIENT ACCOUNT #: [de-identified] LOCATION: [facility]   MEDICAL RECORD #: [id number] DATE OF BIRTH: [birth year] is no superimposed left lower cervical radiculopathy demonstrated at this time. Clinical correlation is indicated.       Dictated By Cammy Emanuel M.D.  d: 03/17/2021 14:07:14  t: 03/17/2021 14:46:24  [facility] [id number]  /

## 2021-04-02 ENCOUNTER — IMMUNIZATION (OUTPATIENT)
Dept: LAB | Age: 61
End: 2021-04-02
Attending: HOSPITALIST
Payer: COMMERCIAL

## 2021-04-02 DIAGNOSIS — Z23 NEED FOR VACCINATION: Primary | ICD-10-CM

## 2021-04-02 PROCEDURE — 0001A SARSCOV2 VAC 30MCG/0.3ML IM: CPT

## 2021-04-12 ENCOUNTER — LAB ENCOUNTER (OUTPATIENT)
Dept: LAB | Facility: HOSPITAL | Age: 61
End: 2021-04-12
Attending: INTERNAL MEDICINE
Payer: COMMERCIAL

## 2021-04-12 DIAGNOSIS — Z01.818 PREOP TESTING: ICD-10-CM

## 2021-04-15 PROBLEM — K21.9 GERD (GASTROESOPHAGEAL REFLUX DISEASE): Status: ACTIVE | Noted: 2021-04-15

## 2021-04-15 PROBLEM — D12.0 BENIGN NEOPLASM OF CECUM: Status: ACTIVE | Noted: 2021-04-15

## 2021-04-15 PROBLEM — K22.9 IRREGULAR Z LINE OF ESOPHAGUS: Status: ACTIVE | Noted: 2021-04-15

## 2021-04-15 PROBLEM — Z12.11 SPECIAL SCREENING FOR MALIGNANT NEOPLASM OF COLON: Status: ACTIVE | Noted: 2021-04-15

## 2021-04-23 ENCOUNTER — IMMUNIZATION (OUTPATIENT)
Dept: LAB | Age: 61
End: 2021-04-23
Attending: HOSPITALIST
Payer: COMMERCIAL

## 2021-04-23 DIAGNOSIS — Z23 NEED FOR VACCINATION: Primary | ICD-10-CM

## 2021-04-23 PROCEDURE — 0002A SARSCOV2 VAC 30MCG/0.3ML IM: CPT

## 2021-10-04 DIAGNOSIS — I10 ESSENTIAL HYPERTENSION WITH GOAL BLOOD PRESSURE LESS THAN 140/90: ICD-10-CM

## 2021-10-08 RX ORDER — AMLODIPINE BESYLATE 10 MG/1
TABLET ORAL
Qty: 90 TABLET | Refills: 0 | Status: SHIPPED | OUTPATIENT
Start: 2021-10-08 | End: 2022-01-11

## 2021-10-08 NOTE — TELEPHONE ENCOUNTER
Pt overdue for repeat lipid panel and Shingrix #2. Please have him repeat lipid panel (fasting lab, order already in place) and f/u with me in office after, can get Shingrix #2 at that time.   I only need 20 min for this visit.  thx.

## 2021-10-08 NOTE — TELEPHONE ENCOUNTER
Been Following CB  Last OV 1/14/21  Last CPE 1/14/21  Last Labs CBC, CMP, Lipid, TSH w Ref, PSA, A1c 1/6/21    Last Rx fill Amlodipine 10mg #90 1R 3/11/21    No future appointments. Per PROTOCOL failed, appt due    Please Approve or Deny.

## 2021-10-15 ENCOUNTER — LAB ENCOUNTER (OUTPATIENT)
Dept: LAB | Age: 61
End: 2021-10-15
Attending: PHYSICIAN ASSISTANT
Payer: COMMERCIAL

## 2021-10-15 DIAGNOSIS — E78.9 ABNORMAL CHOLESTEROL TEST: ICD-10-CM

## 2021-10-15 PROCEDURE — 36415 COLL VENOUS BLD VENIPUNCTURE: CPT

## 2021-10-15 PROCEDURE — 80061 LIPID PANEL: CPT

## 2022-01-10 DIAGNOSIS — Z13.0 SCREENING FOR BLOOD DISEASE: ICD-10-CM

## 2022-01-10 DIAGNOSIS — Z13.220 LIPID SCREENING: Primary | ICD-10-CM

## 2022-01-10 DIAGNOSIS — I10 ESSENTIAL HYPERTENSION WITH GOAL BLOOD PRESSURE LESS THAN 140/90: ICD-10-CM

## 2022-01-10 DIAGNOSIS — Z13.29 THYROID DISORDER SCREEN: ICD-10-CM

## 2022-01-10 DIAGNOSIS — Z12.5 PROSTATE CANCER SCREENING: ICD-10-CM

## 2022-01-11 RX ORDER — AMLODIPINE BESYLATE 10 MG/1
TABLET ORAL
Qty: 90 TABLET | Refills: 0 | Status: SHIPPED | OUTPATIENT
Start: 2022-01-11 | End: 2022-01-26

## 2022-01-11 NOTE — TELEPHONE ENCOUNTER
Medication(s) to Refill:   Requested Prescriptions     Pending Prescriptions Disp Refills   • AMLODIPINE 10 MG Oral Tab [Pharmacy Med Name: AMLODIPINE BESYLATE TABS 10MG] 90 tablet 3     Sig: TAKE 1 TABLET DAILY       LOV:  1--cb-physical    Last La

## 2022-01-11 NOTE — TELEPHONE ENCOUNTER
Pt due for CPE. Please remind him to schedule. Fasting labs ordered to [facility] for pt to do prior to CPE.

## 2022-01-21 ENCOUNTER — LAB ENCOUNTER (OUTPATIENT)
Dept: LAB | Age: 62
End: 2022-01-21
Attending: PHYSICIAN ASSISTANT
Payer: COMMERCIAL

## 2022-01-21 DIAGNOSIS — R73.01 IFG (IMPAIRED FASTING GLUCOSE): ICD-10-CM

## 2022-01-21 DIAGNOSIS — I10 ESSENTIAL HYPERTENSION WITH GOAL BLOOD PRESSURE LESS THAN 140/90: ICD-10-CM

## 2022-01-21 DIAGNOSIS — Z12.5 PROSTATE CANCER SCREENING: ICD-10-CM

## 2022-01-21 DIAGNOSIS — Z13.220 LIPID SCREENING: ICD-10-CM

## 2022-01-21 DIAGNOSIS — R73.01 IFG (IMPAIRED FASTING GLUCOSE): Primary | ICD-10-CM

## 2022-01-21 DIAGNOSIS — Z13.0 SCREENING FOR BLOOD DISEASE: ICD-10-CM

## 2022-01-21 DIAGNOSIS — Z13.29 THYROID DISORDER SCREEN: ICD-10-CM

## 2022-01-21 LAB
ALBUMIN SERPL-MCNC: 3.6 G/DL (ref 3.4–5)
ALBUMIN/GLOB SERPL: 1 {RATIO} (ref 1–2)
ALP LIVER SERPL-CCNC: 69 U/L
ALT SERPL-CCNC: 23 U/L
ANION GAP SERPL CALC-SCNC: 3 MMOL/L (ref 0–18)
AST SERPL-CCNC: 20 U/L (ref 15–37)
BASOPHILS # BLD AUTO: 0.06 X10(3) UL (ref 0–0.2)
BASOPHILS NFR BLD AUTO: 0.9 %
BILIRUB SERPL-MCNC: 0.8 MG/DL (ref 0.1–2)
BUN BLD-MCNC: 15 MG/DL (ref 7–18)
CALCIUM BLD-MCNC: 8.6 MG/DL (ref 8.5–10.1)
CHLORIDE SERPL-SCNC: 107 MMOL/L (ref 98–112)
CHOLEST SERPL-MCNC: 193 MG/DL (ref ?–200)
CO2 SERPL-SCNC: 28 MMOL/L (ref 21–32)
COMPLEXED PSA SERPL-MCNC: 2.15 NG/ML (ref ?–4)
CREAT BLD-MCNC: 0.85 MG/DL
EOSINOPHIL # BLD AUTO: 0.11 X10(3) UL (ref 0–0.7)
EOSINOPHIL NFR BLD AUTO: 1.6 %
ERYTHROCYTE [DISTWIDTH] IN BLOOD BY AUTOMATED COUNT: 13.2 %
EST. AVERAGE GLUCOSE BLD GHB EST-MCNC: 114 MG/DL (ref 68–126)
FASTING PATIENT LIPID ANSWER: YES
FASTING STATUS PATIENT QL REPORTED: YES
GLOBULIN PLAS-MCNC: 3.7 G/DL (ref 2.8–4.4)
GLUCOSE BLD-MCNC: 107 MG/DL (ref 70–99)
HBA1C MFR BLD: 5.6 % (ref ?–5.7)
HCT VFR BLD AUTO: 46.2 %
HDLC SERPL-MCNC: 65 MG/DL (ref 40–59)
HGB BLD-MCNC: 14.8 G/DL
IMM GRANULOCYTES # BLD AUTO: 0.04 X10(3) UL (ref 0–1)
IMM GRANULOCYTES NFR BLD: 0.6 %
LDLC SERPL CALC-MCNC: 116 MG/DL (ref ?–100)
LYMPHOCYTES # BLD AUTO: 2.15 X10(3) UL (ref 1–4)
LYMPHOCYTES NFR BLD AUTO: 31.4 %
MCH RBC QN AUTO: 30.4 PG (ref 26–34)
MCHC RBC AUTO-ENTMCNC: 32 G/DL (ref 31–37)
MCV RBC AUTO: 94.9 FL
MONOCYTES # BLD AUTO: 0.61 X10(3) UL (ref 0.1–1)
MONOCYTES NFR BLD AUTO: 8.9 %
NEUTROPHILS # BLD AUTO: 3.87 X10 (3) UL (ref 1.5–7.7)
NEUTROPHILS # BLD AUTO: 3.87 X10(3) UL (ref 1.5–7.7)
NEUTROPHILS NFR BLD AUTO: 56.6 %
NONHDLC SERPL-MCNC: 128 MG/DL (ref ?–130)
OSMOLALITY SERPL CALC.SUM OF ELEC: 287 MOSM/KG (ref 275–295)
PLATELET # BLD AUTO: 239 10(3)UL (ref 150–450)
POTASSIUM SERPL-SCNC: 4.6 MMOL/L (ref 3.5–5.1)
PROT SERPL-MCNC: 7.3 G/DL (ref 6.4–8.2)
RBC # BLD AUTO: 4.87 X10(6)UL
SODIUM SERPL-SCNC: 138 MMOL/L (ref 136–145)
TRIGL SERPL-MCNC: 64 MG/DL (ref 30–149)
TSI SER-ACNC: 1.28 MIU/ML (ref 0.36–3.74)
VLDLC SERPL CALC-MCNC: 11 MG/DL (ref 0–30)
WBC # BLD AUTO: 6.8 X10(3) UL (ref 4–11)

## 2022-01-21 PROCEDURE — 36415 COLL VENOUS BLD VENIPUNCTURE: CPT

## 2022-01-21 PROCEDURE — 84443 ASSAY THYROID STIM HORMONE: CPT

## 2022-01-21 PROCEDURE — 80061 LIPID PANEL: CPT

## 2022-01-21 PROCEDURE — 83036 HEMOGLOBIN GLYCOSYLATED A1C: CPT

## 2022-01-21 PROCEDURE — 80053 COMPREHEN METABOLIC PANEL: CPT

## 2022-01-21 PROCEDURE — 85025 COMPLETE CBC W/AUTO DIFF WBC: CPT

## 2022-01-26 ENCOUNTER — OFFICE VISIT (OUTPATIENT)
Dept: INTERNAL MEDICINE CLINIC | Facility: CLINIC | Age: 62
End: 2022-01-26
Payer: COMMERCIAL

## 2022-01-26 VITALS
WEIGHT: 239 LBS | HEIGHT: 63.75 IN | TEMPERATURE: 99 F | OXYGEN SATURATION: 98 % | RESPIRATION RATE: 16 BRPM | DIASTOLIC BLOOD PRESSURE: 70 MMHG | SYSTOLIC BLOOD PRESSURE: 134 MMHG | BODY MASS INDEX: 41.31 KG/M2 | HEART RATE: 80 BPM

## 2022-01-26 DIAGNOSIS — Z00.00 ANNUAL PHYSICAL EXAM: Primary | ICD-10-CM

## 2022-01-26 DIAGNOSIS — K21.9 GASTROESOPHAGEAL REFLUX DISEASE, UNSPECIFIED WHETHER ESOPHAGITIS PRESENT: ICD-10-CM

## 2022-01-26 DIAGNOSIS — K22.9 IRREGULAR Z LINE OF ESOPHAGUS: ICD-10-CM

## 2022-01-26 DIAGNOSIS — I10 ESSENTIAL HYPERTENSION WITH GOAL BLOOD PRESSURE LESS THAN 140/90: ICD-10-CM

## 2022-01-26 DIAGNOSIS — G56.22 ULNAR NEUROPATHY OF LEFT UPPER EXTREMITY: ICD-10-CM

## 2022-01-26 PROBLEM — M79.89 CALF SWELLING: Status: RESOLVED | Noted: 2019-04-23 | Resolved: 2022-01-26

## 2022-01-26 PROCEDURE — 3078F DIAST BP <80 MM HG: CPT | Performed by: PHYSICIAN ASSISTANT

## 2022-01-26 PROCEDURE — 3008F BODY MASS INDEX DOCD: CPT | Performed by: PHYSICIAN ASSISTANT

## 2022-01-26 PROCEDURE — 99396 PREV VISIT EST AGE 40-64: CPT | Performed by: PHYSICIAN ASSISTANT

## 2022-01-26 PROCEDURE — 90471 IMMUNIZATION ADMIN: CPT | Performed by: PHYSICIAN ASSISTANT

## 2022-01-26 PROCEDURE — 90472 IMMUNIZATION ADMIN EACH ADD: CPT | Performed by: PHYSICIAN ASSISTANT

## 2022-01-26 PROCEDURE — 90750 HZV VACC RECOMBINANT IM: CPT | Performed by: PHYSICIAN ASSISTANT

## 2022-01-26 PROCEDURE — 90686 IIV4 VACC NO PRSV 0.5 ML IM: CPT | Performed by: PHYSICIAN ASSISTANT

## 2022-01-26 PROCEDURE — 3075F SYST BP GE 130 - 139MM HG: CPT | Performed by: PHYSICIAN ASSISTANT

## 2022-01-26 RX ORDER — OMEPRAZOLE 20 MG/1
20 TABLET, DELAYED RELEASE ORAL DAILY
Qty: 90 TABLET | Refills: 3 | Status: SHIPPED | OUTPATIENT
Start: 2022-01-26

## 2022-01-26 RX ORDER — AMLODIPINE BESYLATE 10 MG/1
10 TABLET ORAL DAILY
Qty: 90 TABLET | Refills: 3 | Status: SHIPPED | OUTPATIENT
Start: 2022-01-26

## 2022-01-26 NOTE — PROGRESS NOTES
Patient presents with:  Physical: room 6 kb  Care Gap Management: flu shot:shingles      HPI:  Pt presents for annual physical.  Pt has persistent numbness in his L 5th finger. 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 (gastroesophageal reflux disease)     Irregular Z line of esophagus     Benign neoplasm of cecum      Past Medical History:   Diagnosis Date   • Heartburn     Take omeprazole 20mg daily   • Sleep apnea    • Unspecified fall     acute rt biceps femoris musc 01/26/2022      Influenza             08/23/2017  10/04/2018      TDAP                  10/25/2014      Zoster Vaccine Recombinant Adjuvanted (Shingrix)                          01/14/2021 01/26/2022    Dental Visits:  Yes  Colonoscopy:  [facility] Calculated Osmolality 01/21/2022 287  275 - 295 mOsm/kg Final   • GFR, Non- 01/21/2022 92  >=60 Final   • GFR, -American 01/21/2022 106  >=60 Final   • AST 01/21/2022 20  15 - 37 U/L Final   • ALT 01/21/2022 23  16 - 61 U/L Final   • uL Final   • Immature Granulocyte Absolute 01/21/2022 0.04  0.00 - 1.00 x10(3) uL Final   • Neutrophil % 01/21/2022 56.6  % Final   • Lymphocyte % 01/21/2022 31.4  % Final   • Monocyte % 01/21/2022 8.9  % Final   • Eosinophil % 01/21/2022 1.6  % Final   • Dayana      All questions were answered and the patient understands the plan.

## 2022-05-24 DIAGNOSIS — I10 ESSENTIAL HYPERTENSION WITH GOAL BLOOD PRESSURE LESS THAN 140/90: ICD-10-CM

## 2022-05-24 RX ORDER — AMLODIPINE BESYLATE 10 MG/1
10 TABLET ORAL DAILY
Qty: 90 TABLET | Refills: 3 | Status: SHIPPED | OUTPATIENT
Start: 2022-05-24

## 2022-06-14 DIAGNOSIS — I10 ESSENTIAL HYPERTENSION WITH GOAL BLOOD PRESSURE LESS THAN 140/90: ICD-10-CM

## 2022-06-14 RX ORDER — AMLODIPINE BESYLATE 10 MG/1
10 TABLET ORAL DAILY
Qty: 90 TABLET | Refills: 3 | OUTPATIENT
Start: 2022-06-14

## 2023-01-25 ENCOUNTER — TELEPHONE (OUTPATIENT)
Dept: INTERNAL MEDICINE CLINIC | Facility: CLINIC | Age: 63
End: 2023-01-25

## 2023-01-30 ENCOUNTER — TELEPHONE (OUTPATIENT)
Dept: INTERNAL MEDICINE CLINIC | Facility: CLINIC | Age: 63
End: 2023-01-30

## 2023-01-30 DIAGNOSIS — Z13.0 SCREENING FOR DISORDER OF BLOOD AND BLOOD-FORMING ORGANS: ICD-10-CM

## 2023-01-30 DIAGNOSIS — Z13.220 SCREENING FOR LIPID DISORDERS: ICD-10-CM

## 2023-01-30 DIAGNOSIS — Z13.228 SCREENING FOR METABOLIC DISORDER: ICD-10-CM

## 2023-01-30 DIAGNOSIS — Z12.5 SCREENING FOR MALIGNANT NEOPLASM OF PROSTATE: ICD-10-CM

## 2023-01-30 DIAGNOSIS — Z00.00 ROUTINE GENERAL MEDICAL EXAMINATION AT A HEALTH CARE FACILITY: Primary | ICD-10-CM

## 2023-01-30 DIAGNOSIS — Z13.29 SCREENING FOR THYROID DISORDER: ICD-10-CM

## 2023-01-30 NOTE — TELEPHONE ENCOUNTER
Patient scheduled with CB.     Future Appointments   Date Time Provider Aleyda Dowell   2/9/2023 10:30 AM Brigitte Neves PA-C [facility]

## 2023-01-30 NOTE — TELEPHONE ENCOUNTER
Informed must fast no call back required. Orders to [address]     Future Appointments   Date Time Provider [facility]   2/9/2023 10:30 AM Inge Rosas PA-C [facility]

## 2023-02-07 ENCOUNTER — LABORATORY ENCOUNTER (OUTPATIENT)
Dept: LAB | Age: 63
End: 2023-02-07
Attending: PHYSICIAN ASSISTANT
Payer: COMMERCIAL

## 2023-02-07 DIAGNOSIS — Z12.5 SCREENING FOR MALIGNANT NEOPLASM OF PROSTATE: ICD-10-CM

## 2023-02-07 DIAGNOSIS — Z13.29 SCREENING FOR THYROID DISORDER: ICD-10-CM

## 2023-02-07 DIAGNOSIS — Z13.228 SCREENING FOR METABOLIC DISORDER: ICD-10-CM

## 2023-02-07 DIAGNOSIS — Z13.220 SCREENING FOR LIPID DISORDERS: ICD-10-CM

## 2023-02-07 DIAGNOSIS — Z00.00 ROUTINE GENERAL MEDICAL EXAMINATION AT A HEALTH CARE FACILITY: ICD-10-CM

## 2023-02-07 DIAGNOSIS — Z13.0 SCREENING FOR DISORDER OF BLOOD AND BLOOD-FORMING ORGANS: ICD-10-CM

## 2023-02-07 LAB
ALBUMIN SERPL-MCNC: 3.4 G/DL (ref 3.4–5)
ALBUMIN/GLOB SERPL: 0.9 {RATIO} (ref 1–2)
ALP LIVER SERPL-CCNC: 65 U/L
ALT SERPL-CCNC: 19 U/L
ANION GAP SERPL CALC-SCNC: 3 MMOL/L (ref 0–18)
AST SERPL-CCNC: 15 U/L (ref 15–37)
BASOPHILS # BLD AUTO: 0.08 X10(3) UL (ref 0–0.2)
BASOPHILS NFR BLD AUTO: 1.3 %
BILIRUB SERPL-MCNC: 0.3 MG/DL (ref 0.1–2)
BUN BLD-MCNC: 17 MG/DL (ref 7–18)
CALCIUM BLD-MCNC: 8.8 MG/DL (ref 8.5–10.1)
CHLORIDE SERPL-SCNC: 109 MMOL/L (ref 98–112)
CHOLEST SERPL-MCNC: 180 MG/DL (ref ?–200)
CO2 SERPL-SCNC: 27 MMOL/L (ref 21–32)
COMPLEXED PSA SERPL-MCNC: 1.63 NG/ML (ref ?–4)
CREAT BLD-MCNC: 0.96 MG/DL
EOSINOPHIL # BLD AUTO: 0.08 X10(3) UL (ref 0–0.7)
EOSINOPHIL NFR BLD AUTO: 1.3 %
ERYTHROCYTE [DISTWIDTH] IN BLOOD BY AUTOMATED COUNT: 12.8 %
FASTING PATIENT LIPID ANSWER: YES
FASTING STATUS PATIENT QL REPORTED: YES
GFR SERPLBLD BASED ON 1.73 SQ M-ARVRAT: 88 ML/MIN/1.73M2 (ref 60–?)
GLOBULIN PLAS-MCNC: 3.8 G/DL (ref 2.8–4.4)
GLUCOSE BLD-MCNC: 110 MG/DL (ref 70–99)
HCT VFR BLD AUTO: 45.8 %
HDLC SERPL-MCNC: 66 MG/DL (ref 40–59)
HGB BLD-MCNC: 15.4 G/DL
IMM GRANULOCYTES # BLD AUTO: 0.03 X10(3) UL (ref 0–1)
IMM GRANULOCYTES NFR BLD: 0.5 %
LDLC SERPL CALC-MCNC: 102 MG/DL (ref ?–100)
LYMPHOCYTES # BLD AUTO: 2.25 X10(3) UL (ref 1–4)
LYMPHOCYTES NFR BLD AUTO: 35.3 %
MCH RBC QN AUTO: 31.9 PG (ref 26–34)
MCHC RBC AUTO-ENTMCNC: 33.6 G/DL (ref 31–37)
MCV RBC AUTO: 94.8 FL
MONOCYTES # BLD AUTO: 0.57 X10(3) UL (ref 0.1–1)
MONOCYTES NFR BLD AUTO: 8.9 %
NEUTROPHILS # BLD AUTO: 3.37 X10 (3) UL (ref 1.5–7.7)
NEUTROPHILS # BLD AUTO: 3.37 X10(3) UL (ref 1.5–7.7)
NEUTROPHILS NFR BLD AUTO: 52.7 %
NONHDLC SERPL-MCNC: 114 MG/DL (ref ?–130)
OSMOLALITY SERPL CALC.SUM OF ELEC: 290 MOSM/KG (ref 275–295)
PLATELET # BLD AUTO: 254 10(3)UL (ref 150–450)
POTASSIUM SERPL-SCNC: 4.6 MMOL/L (ref 3.5–5.1)
PROT SERPL-MCNC: 7.2 G/DL (ref 6.4–8.2)
RBC # BLD AUTO: 4.83 X10(6)UL
SODIUM SERPL-SCNC: 139 MMOL/L (ref 136–145)
TRIGL SERPL-MCNC: 64 MG/DL (ref 30–149)
TSI SER-ACNC: 1.57 MIU/ML (ref 0.36–3.74)
VLDLC SERPL CALC-MCNC: 11 MG/DL (ref 0–30)
WBC # BLD AUTO: 6.4 X10(3) UL (ref 4–11)

## 2023-02-07 PROCEDURE — 80061 LIPID PANEL: CPT

## 2023-02-07 PROCEDURE — 85025 COMPLETE CBC W/AUTO DIFF WBC: CPT

## 2023-02-07 PROCEDURE — 84443 ASSAY THYROID STIM HORMONE: CPT

## 2023-02-07 PROCEDURE — 36415 COLL VENOUS BLD VENIPUNCTURE: CPT

## 2023-02-07 PROCEDURE — 80053 COMPREHEN METABOLIC PANEL: CPT

## 2023-02-09 ENCOUNTER — OFFICE VISIT (OUTPATIENT)
Dept: INTERNAL MEDICINE CLINIC | Facility: CLINIC | Age: 63
End: 2023-02-09
Payer: COMMERCIAL

## 2023-02-09 VITALS
RESPIRATION RATE: 18 BRPM | DIASTOLIC BLOOD PRESSURE: 68 MMHG | HEART RATE: 78 BPM | BODY MASS INDEX: 42.2 KG/M2 | HEIGHT: 63.75 IN | SYSTOLIC BLOOD PRESSURE: 126 MMHG | WEIGHT: 244.19 LBS | OXYGEN SATURATION: 97 %

## 2023-02-09 DIAGNOSIS — E66.01 CLASS 3 SEVERE OBESITY WITHOUT SERIOUS COMORBIDITY WITH BODY MASS INDEX (BMI) OF 40.0 TO 44.9 IN ADULT, UNSPECIFIED OBESITY TYPE (HCC): ICD-10-CM

## 2023-02-09 DIAGNOSIS — K21.9 GASTROESOPHAGEAL REFLUX DISEASE WITHOUT ESOPHAGITIS: ICD-10-CM

## 2023-02-09 DIAGNOSIS — Z23 NEED FOR PNEUMOCOCCAL VACCINATION: ICD-10-CM

## 2023-02-09 DIAGNOSIS — I10 ESSENTIAL HYPERTENSION WITH GOAL BLOOD PRESSURE LESS THAN 140/90: ICD-10-CM

## 2023-02-09 DIAGNOSIS — Z00.00 ANNUAL PHYSICAL EXAM: Primary | ICD-10-CM

## 2023-02-09 DIAGNOSIS — K22.9 IRREGULAR Z LINE OF ESOPHAGUS: ICD-10-CM

## 2023-02-09 DIAGNOSIS — D12.0 BENIGN NEOPLASM OF CECUM: ICD-10-CM

## 2023-02-09 PROCEDURE — 3078F DIAST BP <80 MM HG: CPT | Performed by: PHYSICIAN ASSISTANT

## 2023-02-09 PROCEDURE — 3008F BODY MASS INDEX DOCD: CPT | Performed by: PHYSICIAN ASSISTANT

## 2023-02-09 PROCEDURE — 99397 PER PM REEVAL EST PAT 65+ YR: CPT | Performed by: PHYSICIAN ASSISTANT

## 2023-02-09 PROCEDURE — 90677 PCV20 VACCINE IM: CPT | Performed by: PHYSICIAN ASSISTANT

## 2023-02-09 PROCEDURE — 3074F SYST BP LT 130 MM HG: CPT | Performed by: PHYSICIAN ASSISTANT

## 2023-02-09 PROCEDURE — 90471 IMMUNIZATION ADMIN: CPT | Performed by: PHYSICIAN ASSISTANT

## 2023-03-21 NOTE — PROGRESS NOTES
Due to COVID-19 ACTION PLAN, the patient's office visit was converted to a phone visit. Jeremiah Cronin verbally consents to a Virtual/Telephone visit on 06/25/20.  Patient understands and accepts financial responsibility for any deductible, co-insuran limitations of this visit as no or only very limited physical exam could be performed. Every conscious effort was taken to allow for sufficient and adequate time.   This billing visit was spent on reviewing labs, medications, radiology tests and decision m [place]

## 2023-06-04 DIAGNOSIS — I10 ESSENTIAL HYPERTENSION WITH GOAL BLOOD PRESSURE LESS THAN 140/90: ICD-10-CM

## 2023-06-05 RX ORDER — AMLODIPINE BESYLATE 10 MG/1
TABLET ORAL
Qty: 90 TABLET | Refills: 3 | Status: SHIPPED | OUTPATIENT
Start: 2023-06-05

## 2023-06-05 NOTE — TELEPHONE ENCOUNTER
Hypertension Medications Protocol Passed 06/04/2023 11:55 PM   Protocol Details  CMP or BMP in past 12 months    Last serum creatinine< 2.0    Appointment in past 6 or next 3 months     Last annual 2/9/23     Return in about 1 year (around 2/9/2024) for Annual physical.

## 2024-05-29 ENCOUNTER — TELEPHONE (OUTPATIENT)
Dept: INTERNAL MEDICINE CLINIC | Facility: CLINIC | Age: 64
End: 2024-05-29

## 2024-05-29 DIAGNOSIS — I10 ESSENTIAL HYPERTENSION WITH GOAL BLOOD PRESSURE LESS THAN 140/90: ICD-10-CM

## 2024-06-02 NOTE — TELEPHONE ENCOUNTER
Please review. Protocol Failed; No Protocol     Routed to Patient  for assistance with appointment.     Requested Prescriptions   Pending Prescriptions Disp Refills    AMLODIPINE 10 MG Oral Tab [Pharmacy Med Name: AMLODIPINE BESYLATE TABS 10MG] 90 tablet 3     Sig: TAKE 1 TABLET DAILY       Hypertension Medications Protocol Failed - 5/29/2024 11:34 PM        Failed - CMP or BMP in past 12 months        Failed - In person appointment or virtual visit in the past 12 mos or appointment in next 3 mos     Recent Outpatient Visits              1 year ago Annual physical exam    [facility], [address] [place] [address] Janine Cadet PA-C    Office Visit    2 years ago Annual physical exam    [address] Janine Cadet PA-C    Office Visit    3 years ago Gastroesophageal reflux disease, unspecified whether esophagitis present    [facility]  [facility] Johnny Sanchez MD    Office Visit    3 years ago Numbness and tingling in [facility] Tamiko Mann MD    Office Visit    3 years ago Chronic heartburn    [facility],  [facility] Eduard Stubbs MD    Office Visit                      Failed - EGFRCR or GFRNAA > 50     GFR Evaluation            Passed - Last BP reading less than 140/90     BP Readings from Last 1 Encounters:   02/09/23 126/68                        Recent Outpatient Visits              1 year ago Annual physical exam    [address] Janine Cadet PA-C    Office Visit    2 years ago Annual physical exam    [address] Janine Cadet PA-C    Office Visit    3 years ago Gastroesophageal reflux disease, unspecified whether esophagitis present    [facility]  [facility] Johnny Sanchez MD    Office Visit    3 years ago Numbness and tingling in both [facility]  [facility]diagn[facility] Tamiko Mann MD    Office Visit    3 years ago Chronic heartburn    [facility],  [facility] Eduard Stubbs MD    Office Visit

## 2024-06-03 RX ORDER — AMLODIPINE BESYLATE 10 MG/1
10 TABLET ORAL DAILY
Qty: 90 TABLET | Refills: 0 | Status: SHIPPED | OUTPATIENT
Start: 2024-06-03

## 2024-08-27 ENCOUNTER — TELEPHONE (OUTPATIENT)
Dept: INTERNAL MEDICINE CLINIC | Facility: CLINIC | Age: 64
End: 2024-08-27

## 2024-08-27 DIAGNOSIS — I10 ESSENTIAL HYPERTENSION WITH GOAL BLOOD PRESSURE LESS THAN 140/90: ICD-10-CM

## 2024-08-29 RX ORDER — AMLODIPINE BESYLATE 10 MG/1
10 TABLET ORAL DAILY
Qty: 90 TABLET | Refills: 0 | Status: SHIPPED | OUTPATIENT
Start: 2024-08-29

## 2024-08-29 NOTE — TELEPHONE ENCOUNTER
Please review. Protocol Failed; No Protocol      No future appointments.    Routed to Patient  for assistance with appointment.     Requested Prescriptions   Pending Prescriptions Disp Refills    AMLODIPINE 10 MG Oral Tab [Pharmacy Med Name: AMLODIPINE BESYLATE TABS 10MG] 90 tablet 3     Sig: TAKE 1 TABLET DAILY       Hypertension Medications Protocol Failed - 8/27/2024 11:36 PM        Failed - CMP or BMP in past 12 months        Failed - In person appointment or virtual visit in the past 12 mos or appointment in next 3 mos     Recent Outpatient Visits              1 year ago Annual physical exam    [facility], [address] [place], [place] [address]Rosio Christina, PA-C    Office Visit    2 years ago Annual physical exam    [address]    Office Visit    3 years ago Gastroesophageal reflux disease, unspecified whether esophagitis present    [facility],  [facility] Johnny Sanchez MD    Office Visit    3 years ago Numbness and tingling in both hands    [facility] Tamiko Mann MD    Office Visit    3 years ago Chronic heartburn    [facility]enter[facility],  [employer] Eduard Stubbs MD    Office Visit                      Failed - EGFRCR or GFRNAA > 50     GFR Evaluation            Passed - Last BP reading less than 140/90     BP Readings from Last 1 Encounters:   02/09/23 126/68                        Recent Outpatient Visits              1 year ago Annual physical exam    [address]    Office Visit    2 years ago Annual physical exam    [address]    Office Visit    3 years ago Gastroesophageal reflux disease, unspecified whether esophagitis present    [facility]  [facility] Johnny Sanchez MD    Office Visit    3 years ago Numbness and tingling in  both [facility]diagnostics Tamiko Mann MD    Office Visit    3 years ago Chronic heartburn    [facility],  [facility] Eduard Stubbs MD    Office Visit

## 2024-08-30 NOTE — TELEPHONE ENCOUNTER
Spoke with patient, he will ck his work schedule and call back to schedule his annual physical with  RONNY PLATT PA-C.    Patient asks if we can refill for 30 days until he schedules?

## 2025-02-17 ENCOUNTER — TELEPHONE (OUTPATIENT)
Dept: INTERNAL MEDICINE CLINIC | Facility: CLINIC | Age: 65
End: 2025-02-17

## 2025-02-17 DIAGNOSIS — Z12.5 SPECIAL SCREENING FOR MALIGNANT NEOPLASM OF PROSTATE: ICD-10-CM

## 2025-02-17 DIAGNOSIS — Z13.29 SCREENING FOR THYROID DISORDER: ICD-10-CM

## 2025-02-17 DIAGNOSIS — Z13.0 SCREENING FOR BLOOD DISEASE: ICD-10-CM

## 2025-02-17 DIAGNOSIS — I10 ESSENTIAL HYPERTENSION WITH GOAL BLOOD PRESSURE LESS THAN 140/90: ICD-10-CM

## 2025-02-17 DIAGNOSIS — Z13.29 SCREENING FOR ENDOCRINE, NUTRITIONAL, METABOLIC AND IMMUNITY DISORDER: ICD-10-CM

## 2025-02-17 DIAGNOSIS — Z13.0 SCREENING FOR ENDOCRINE, NUTRITIONAL, METABOLIC AND IMMUNITY DISORDER: ICD-10-CM

## 2025-02-17 DIAGNOSIS — Z13.21 SCREENING FOR ENDOCRINE, NUTRITIONAL, METABOLIC AND IMMUNITY DISORDER: ICD-10-CM

## 2025-02-17 DIAGNOSIS — Z13.228 SCREENING FOR ENDOCRINE, NUTRITIONAL, METABOLIC AND IMMUNITY DISORDER: ICD-10-CM

## 2025-02-17 DIAGNOSIS — Z13.220 SCREENING FOR LIPOID DISORDERS: ICD-10-CM

## 2025-02-17 DIAGNOSIS — Z00.00 ROUTINE GENERAL MEDICAL EXAMINATION AT A HEALTH CARE FACILITY: Primary | ICD-10-CM

## 2025-02-17 NOTE — TELEPHONE ENCOUNTER
Patient called request labs prior to their annual physical.  Annual physical scheduled for   Future Appointments   Date Time Provider Department Center   2/27/2025  8:30 AM Janine Duque PA-C [facility]         Please order labs. Patient preferred lab is [facility]   Patient informed request was sent to clinical team.  Patient informed to fast for labs.  No callback required.

## 2025-03-14 ENCOUNTER — LABORATORY ENCOUNTER (OUTPATIENT)
Dept: LAB | Age: 65
End: 2025-03-14
Attending: PHYSICIAN ASSISTANT
Payer: COMMERCIAL

## 2025-03-14 DIAGNOSIS — Z13.21 SCREENING FOR ENDOCRINE, NUTRITIONAL, METABOLIC AND IMMUNITY DISORDER: ICD-10-CM

## 2025-03-14 DIAGNOSIS — Z12.5 SPECIAL SCREENING FOR MALIGNANT NEOPLASM OF PROSTATE: ICD-10-CM

## 2025-03-14 DIAGNOSIS — Z13.29 SCREENING FOR THYROID DISORDER: ICD-10-CM

## 2025-03-14 DIAGNOSIS — Z13.0 SCREENING FOR BLOOD DISEASE: ICD-10-CM

## 2025-03-14 DIAGNOSIS — Z13.0 SCREENING FOR ENDOCRINE, NUTRITIONAL, METABOLIC AND IMMUNITY DISORDER: ICD-10-CM

## 2025-03-14 DIAGNOSIS — Z13.29 SCREENING FOR ENDOCRINE, NUTRITIONAL, METABOLIC AND IMMUNITY DISORDER: ICD-10-CM

## 2025-03-14 DIAGNOSIS — I10 ESSENTIAL HYPERTENSION WITH GOAL BLOOD PRESSURE LESS THAN 140/90: ICD-10-CM

## 2025-03-14 DIAGNOSIS — Z00.00 ROUTINE GENERAL MEDICAL EXAMINATION AT A HEALTH CARE FACILITY: ICD-10-CM

## 2025-03-14 DIAGNOSIS — Z13.220 SCREENING FOR LIPOID DISORDERS: ICD-10-CM

## 2025-03-14 DIAGNOSIS — Z13.228 SCREENING FOR ENDOCRINE, NUTRITIONAL, METABOLIC AND IMMUNITY DISORDER: ICD-10-CM

## 2025-03-14 LAB
ALBUMIN SERPL-MCNC: 4.4 G/DL (ref 3.2–4.8)
ALBUMIN/GLOB SERPL: 1.6 {RATIO} (ref 1–2)
ALP LIVER SERPL-CCNC: 57 U/L
ALT SERPL-CCNC: 14 U/L
ANION GAP SERPL CALC-SCNC: 8 MMOL/L (ref 0–18)
AST SERPL-CCNC: 21 U/L (ref ?–34)
BASOPHILS # BLD AUTO: 0.08 X10(3) UL (ref 0–0.2)
BASOPHILS NFR BLD AUTO: 1.2 %
BILIRUB SERPL-MCNC: 0.6 MG/DL (ref 0.2–1.1)
BUN BLD-MCNC: 17 MG/DL (ref 9–23)
CALCIUM BLD-MCNC: 9.5 MG/DL (ref 8.7–10.6)
CHLORIDE SERPL-SCNC: 106 MMOL/L (ref 98–112)
CHOLEST SERPL-MCNC: 194 MG/DL (ref ?–200)
CO2 SERPL-SCNC: 27 MMOL/L (ref 21–32)
COMPLEXED PSA SERPL-MCNC: 1.63 NG/ML (ref ?–4)
CREAT BLD-MCNC: 0.96 MG/DL
EGFRCR SERPLBLD CKD-EPI 2021: 87 ML/MIN/1.73M2 (ref 60–?)
EOSINOPHIL # BLD AUTO: 0.08 X10(3) UL (ref 0–0.7)
EOSINOPHIL NFR BLD AUTO: 1.2 %
ERYTHROCYTE [DISTWIDTH] IN BLOOD BY AUTOMATED COUNT: 13.2 %
FASTING PATIENT LIPID ANSWER: YES
FASTING STATUS PATIENT QL REPORTED: YES
GLOBULIN PLAS-MCNC: 2.7 G/DL (ref 2–3.5)
GLUCOSE BLD-MCNC: 100 MG/DL (ref 70–99)
HCT VFR BLD AUTO: 46.3 %
HDLC SERPL-MCNC: 64 MG/DL (ref 40–59)
HGB BLD-MCNC: 15.7 G/DL
IMM GRANULOCYTES # BLD AUTO: 0.05 X10(3) UL (ref 0–1)
IMM GRANULOCYTES NFR BLD: 0.7 %
LDLC SERPL CALC-MCNC: 118 MG/DL (ref ?–100)
LYMPHOCYTES # BLD AUTO: 2.21 X10(3) UL (ref 1–4)
LYMPHOCYTES NFR BLD AUTO: 32.4 %
MCH RBC QN AUTO: 31.6 PG (ref 26–34)
MCHC RBC AUTO-ENTMCNC: 33.9 G/DL (ref 31–37)
MCV RBC AUTO: 93.2 FL
MONOCYTES # BLD AUTO: 0.56 X10(3) UL (ref 0.1–1)
MONOCYTES NFR BLD AUTO: 8.2 %
NEUTROPHILS # BLD AUTO: 3.84 X10 (3) UL (ref 1.5–7.7)
NEUTROPHILS # BLD AUTO: 3.84 X10(3) UL (ref 1.5–7.7)
NEUTROPHILS NFR BLD AUTO: 56.3 %
NONHDLC SERPL-MCNC: 130 MG/DL (ref ?–130)
OSMOLALITY SERPL CALC.SUM OF ELEC: 294 MOSM/KG (ref 275–295)
PLATELET # BLD AUTO: 251 10(3)UL (ref 150–450)
PLATELETS.RETICULATED NFR BLD AUTO: 2.5 % (ref 0–7)
POTASSIUM SERPL-SCNC: 4.7 MMOL/L (ref 3.5–5.1)
PROT SERPL-MCNC: 7.1 G/DL (ref 5.7–8.2)
RBC # BLD AUTO: 4.97 X10(6)UL
SODIUM SERPL-SCNC: 141 MMOL/L (ref 136–145)
TRIGL SERPL-MCNC: 68 MG/DL (ref 30–149)
TSI SER-ACNC: 1.8 UIU/ML (ref 0.55–4.78)
VLDLC SERPL CALC-MCNC: 12 MG/DL (ref 0–30)
WBC # BLD AUTO: 6.8 X10(3) UL (ref 4–11)

## 2025-03-14 PROCEDURE — 36415 COLL VENOUS BLD VENIPUNCTURE: CPT

## 2025-03-14 PROCEDURE — 85025 COMPLETE CBC W/AUTO DIFF WBC: CPT

## 2025-03-14 PROCEDURE — 80053 COMPREHEN METABOLIC PANEL: CPT

## 2025-03-14 PROCEDURE — 80061 LIPID PANEL: CPT

## 2025-03-14 PROCEDURE — 84443 ASSAY THYROID STIM HORMONE: CPT

## 2025-03-17 NOTE — PROGRESS NOTES
Chief Complaint   Patient presents with    Physical     Rm 6 SS  Reviewed Preventative/Wellness form with patient.         HPI:  Pt presents for annual physical.  Last visit/CPE was in 2023.    Lifting weights and stretching regularly but has not been walking regularly.    Out of the habit of walking for exercise during the week.    Weight overall trending down when compared to last visit in 2023 (4#).    Pt did lose a significant amount of weight in the past through severely limiting carbs but he does not thing this is a long term viable option.   HTN - ran out of Amlodipine so has not been taking.       Having some pain in L arm pit off and on, not related to activity.  Not limiting, just wondering what the cause might be.      Review of Systems   Constitutional: Negative for fever, chills and fatigue. No distress.  HENT: Negative for hearing loss, congestion, sore throat, neck pain and dental problem.    Eyes: Negative for pain and visual disturbance.   Respiratory: Negative for cough, chest tightness, shortness of breath and wheezing.    Cardiovascular: Negative for chest pain, palpitations and leg swelling.   Gastrointestinal: Negative for nausea, vomiting, abdominal pain, diarrhea, blood in stool   Genitourinary: Negative for dysuria, hematuria and difficulty urinating.   Musculoskeletal: Negative for myalgias, back pain, joint swelling, arthralgias and gait problem.   Skin: Negative for color change and rash.   Neurological: Negative for dizziness, syncope, weakness, and headaches.   Hematological: Negative for adenopathy. Does not bruise/bleed easily.   Psychiatric/Behavioral: The patient is not nervous/anxious. No depression.    Patient Active Problem List   Diagnosis    Diverticulosis of colon (without mention of hemorrhage)    Unspecified hemorrhoids without mention of complication    Obesity    Dysmetabolic syndrome X    Reflux esophagitis    Primary osteoarthritis of left knee    Effusion of knee joint,  left    Pitting edema    Internal derangement of knee, left    Acute medial meniscus tear of left knee    Essential hypertension with goal blood pressure less than 140/90    Numbness and tingling in both hands    GERD (gastroesophageal reflux disease)    Irregular Z line of esophagus    Benign neoplasm of cecum       Past Medical History:    Heartburn    Take omeprazole 20mg daily    Sleep apnea    Unspecified fall    acute rt biceps femoris muscle strain    Wears glasses     Past Surgical History:   Procedure Laterality Date    Colonoscopy  01/26/2011    Eduard Stubbs MD    Colonoscopy      Egd  05/2021    Repeat due in 5 yrs (2026) per Evelyne    Esophagoscopy,biopsy  01/26/2011    Final pathology reviewed (paper chart pulled) and NO ELIZABETH'S ESOPHAGUS; irregular z-line w/ 1 c proximal displacement and squamous islands,suggesting short segment Elizabeth's Esophogus    Knee surgery Right      Family History   Problem Relation Age of Onset    Other (CAD [Other]) Father     Colon Polyps Father     Stroke Mother     Other (cholelithiasis [Other]) Other         family hx    Other (stroke syndrome [Other]) Other         family hx     Social History     Socioeconomic History    Marital status:    Tobacco Use    Smoking status: Never    Smokeless tobacco: Never   Vaping Use    Vaping status: Never Used   Substance and Sexual Activity    Alcohol use: Yes     Alcohol/week: 3.0 standard drinks of alcohol     Types: 3 Standard drinks or equivalent per week     Comment: socially    Drug use: No    Sexual activity: Not Currently   Other Topics Concern    Caffeine Concern Yes    Exercise Yes     Comment: daily     Social Drivers of Health     Food Insecurity: No Food Insecurity (3/19/2025)    NCSS - Food Insecurity     Worried About Running Out of Food in the Last Year: No     Ran Out of Food in the Last Year: No   Transportation Needs: No Transportation Needs (3/19/2025)    NCSS - Transportation     Lack of  Transportation: No   Housing Stability: Not At Risk (3/19/2025)    NCSS - Housing/Utilities     Has Housing: Yes     Worried About Losing Housing: No     Unable to Get Utilities: No       Current Outpatient Medications   Medication Sig Dispense Refill    amLODIPine 10 MG Oral Tab Take 1 tablet (10 mg total) by mouth daily. 90 tablet 3    Omeprazole Magnesium 20 MG Oral Tab EC Take 1 tablet (20 mg total) by mouth daily. 90 tablet 3    Fluticasone Propionate 50 MCG/ACT Nasal Suspension by Each Nare route daily.         Allergies  Allergies[1]    Health Maintenance  Immunizations:  Immunization History   Administered Date(s) Administered    Covid-19 Vaccine Pfizer 30 mcg/0.3 ml 04/02/2021, 04/23/2021, 01/12/2022    FLULAVAL 6 months & older 0.5 ml Prefilled syringe (08900) 01/26/2022    Influenza 08/23/2017, 10/04/2018    Pneumococcal Conjugate PCV20 02/09/2023    TDAP 10/25/2014, 03/19/2025    Zoster Vaccine Recombinant Adjuvanted (Shingrix) 01/14/2021, 01/26/2022     Dental Visits:  Yes  Colonoscopy:  UTD    Physical Exam  /83   Pulse 65   Temp 97 °F (36.1 °C) (Temporal)   Resp 18   Ht 5' 3.75\" (1.619 m)   Wt 240 lb (108.9 kg)   SpO2 96%   BMI 41.52 kg/m²   Constitutional: Oriented to person, place, and time. No distress.   HEENT:  Normocephalic and atraumatic. Tympanic membranes normal.  Nose normal. Oropharynx is clear and moist.   Eyes: Conjunctivae are normal. PERRLA. No scleral icterus.   Neck: Normal range of motion. Neck supple. No carotid bruits bilaterally. No edema present.  Cardiovascular: Normal rate, regular rhythm and intact distal pulses.  No murmur, rubs or gallops.   Pulmonary/Chest: Effort normal and breath sounds normal. No respiratory distress. No wheezes, rhonchi or rales  Abdominal: Soft. Bowel sounds are normal. Non tender, no masses, no organomegaly or hernias.  Musculoskeletal: Normal range of motion. No edema and no tenderness.  No effusions.  Lymphadenopathy: No cervical  adenopathy.   Neurological: Normal reflexes. No cranial nerve deficit or sensory deficit. Normal muscle tone. Coordination normal.   Skin: Skin is warm and dry. No rash noted. No erythema. No pallor.   Psychiatric: Normal mood and affect.     Laboratory Encounter on 03/14/2025   Component Date Value Ref Range Status    WBC 03/14/2025 6.8  4.0 - 11.0 x10(3) uL Final    RBC 03/14/2025 4.97  3.80 - 5.80 x10(6)uL Final    HGB 03/14/2025 15.7  13.0 - 17.5 g/dL Final    HCT 03/14/2025 46.3  39.0 - 53.0 % Final    PLT 03/14/2025 251.0  150.0 - 450.0 10(3)uL Final    Immature Platelet Fraction 03/14/2025 2.5  0.0 - 7.0 % Final    MCV 03/14/2025 93.2  80.0 - 100.0 fL Final    MCH 03/14/2025 31.6  26.0 - 34.0 pg Final    MCHC 03/14/2025 33.9  31.0 - 37.0 g/dL Final    RDW 03/14/2025 13.2  % Final    Neutrophil Absolute Prelim 03/14/2025 3.84  1.50 - 7.70 x10 (3) uL Final    Neutrophil Absolute 03/14/2025 3.84  1.50 - 7.70 x10(3) uL Final    Lymphocyte Absolute 03/14/2025 2.21  1.00 - 4.00 x10(3) uL Final    Monocyte Absolute 03/14/2025 0.56  0.10 - 1.00 x10(3) uL Final    Eosinophil Absolute 03/14/2025 0.08  0.00 - 0.70 x10(3) uL Final    Basophil Absolute 03/14/2025 0.08  0.00 - 0.20 x10(3) uL Final    Immature Granulocyte Absolute 03/14/2025 0.05  0.00 - 1.00 x10(3) uL Final    Neutrophil % 03/14/2025 56.3  % Final    Lymphocyte % 03/14/2025 32.4  % Final    Monocyte % 03/14/2025 8.2  % Final    Eosinophil % 03/14/2025 1.2  % Final    Basophil % 03/14/2025 1.2  % Final    Immature Granulocyte % 03/14/2025 0.7  % Final    Glucose 03/14/2025 100 (H)  70 - 99 mg/dL Final    Sodium 03/14/2025 141  136 - 145 mmol/L Final    Potassium 03/14/2025 4.7  3.5 - 5.1 mmol/L Final    Chloride 03/14/2025 106  98 - 112 mmol/L Final    CO2 03/14/2025 27.0  21.0 - 32.0 mmol/L Final    Anion Gap 03/14/2025 8  0 - 18 mmol/L Final    BUN 03/14/2025 17  9 - 23 mg/dL Final    Creatinine 03/14/2025 0.96  0.70 - 1.30 mg/dL Final    Calcium, Total  03/14/2025 9.5  8.7 - 10.6 mg/dL Final    Calculated Osmolality 03/14/2025 294  275 - 295 mOsm/kg Final    eGFR-Cr 03/14/2025 87  >=60 mL/min/1.73m2 Final    AST 03/14/2025 21  <34 U/L Final    ALT 03/14/2025 14  10 - 49 U/L Final    Alkaline Phosphatase 03/14/2025 57  45 - 117 U/L Final    Bilirubin, Total 03/14/2025 0.6  0.2 - 1.1 mg/dL Final    Total Protein 03/14/2025 7.1  5.7 - 8.2 g/dL Final    Albumin 03/14/2025 4.4  3.2 - 4.8 g/dL Final    Globulin  03/14/2025 2.7  2.0 - 3.5 g/dL Final    A/G Ratio 03/14/2025 1.6  1.0 - 2.0 Final    Patient Fasting for CMP? 03/14/2025 Yes   Final    Cholesterol, Total 03/14/2025 194  <200 mg/dL Final    HDL Cholesterol 03/14/2025 64 (H)  40 - 59 mg/dL Final    Triglycerides 03/14/2025 68  30 - 149 mg/dL Final    LDL Cholesterol 03/14/2025 118 (H)  <100 mg/dL Final    VLDL 03/14/2025 12  0 - 30 mg/dL Final    Non HDL Chol 03/14/2025 130 (H)  <130 mg/dL Final    Patient Fasting for Lipid? 03/14/2025 Yes   Final    TSH 03/14/2025 1.798  0.550 - 4.780 uIU/mL Final    Prostate Specific Antigen Screen  03/14/2025 1.63  <=4.00 ng/mL Final     3/19/25:  HBA1c 5.5     A/P:    Encounter Diagnoses   Name Primary?    Annual physical exam - Tdap today.  Encouraged increase in CV exercise with goal of 30 min 5 times a week minimum.  Encouraged work on wt loss.  Discussed possible diet changes (portion size control, some limiting of carbs).  Pt declines COVID booster and flu shot.   Yes    Essential hypertension with goal blood pressure less than 140/90 - restart Amlodipine 10 mg daily for more optimal control.       Dysmetabolic syndrome X - A1c normal today.  Encouraged work on diet, exercise and wt loss.       Gastroesophageal reflux disease without esophagitis - compliant with PPI, controlled, continue.     Class 3 severe obesity without serious comorbidity with body mass index (BMI) of 40.0 to 44.9 in adult, unspecified obesity type (HCC) - See above, reviewed importance of diet  exercise and wt loss.       Need for Tdap vaccination     Hyperglycemia - A1c in office today normal.  Will continue to follow.        Orders Placed This Encounter   Procedures    POC Hemoglobin A1C    TdaP (Adacel, Boostrix) [32104]       Meds & Refills for this Visit:  Requested Prescriptions     Signed Prescriptions Disp Refills    amLODIPine 10 MG Oral Tab 90 tablet 3     Sig: Take 1 tablet (10 mg total) by mouth daily.       Imaging & Consults:  TETANUS, DIPHTHERIA TOXOIDS AND ACELLULAR PERTUSIS VACCINE (TDAP), >7 YEARS, IM USE      Return in about 1 year (around 3/19/2026), or if symptoms worsen or fail to improve, for Annual physical.    There are no Patient Instructions on file for this visit.    All questions were answered and the patient understands the plan.                  [1] No Known Allergies

## 2025-03-19 ENCOUNTER — OFFICE VISIT (OUTPATIENT)
Dept: INTERNAL MEDICINE CLINIC | Facility: CLINIC | Age: 65
End: 2025-03-19
Payer: COMMERCIAL

## 2025-03-19 VITALS
OXYGEN SATURATION: 96 % | SYSTOLIC BLOOD PRESSURE: 138 MMHG | DIASTOLIC BLOOD PRESSURE: 83 MMHG | RESPIRATION RATE: 18 BRPM | HEART RATE: 65 BPM | WEIGHT: 240 LBS | BODY MASS INDEX: 41.48 KG/M2 | TEMPERATURE: 97 F | HEIGHT: 63.75 IN

## 2025-03-19 DIAGNOSIS — R73.9 HYPERGLYCEMIA: ICD-10-CM

## 2025-03-19 DIAGNOSIS — E66.01 CLASS 3 SEVERE OBESITY WITHOUT SERIOUS COMORBIDITY WITH BODY MASS INDEX (BMI) OF 40.0 TO 44.9 IN ADULT, UNSPECIFIED OBESITY TYPE (HCC): ICD-10-CM

## 2025-03-19 DIAGNOSIS — Z23 NEED FOR TDAP VACCINATION: ICD-10-CM

## 2025-03-19 DIAGNOSIS — I10 ESSENTIAL HYPERTENSION WITH GOAL BLOOD PRESSURE LESS THAN 140/90: ICD-10-CM

## 2025-03-19 DIAGNOSIS — Z00.00 ANNUAL PHYSICAL EXAM: Primary | ICD-10-CM

## 2025-03-19 DIAGNOSIS — K21.9 GASTROESOPHAGEAL REFLUX DISEASE WITHOUT ESOPHAGITIS: ICD-10-CM

## 2025-03-19 DIAGNOSIS — E88.810 DYSMETABOLIC SYNDROME X: ICD-10-CM

## 2025-03-19 DIAGNOSIS — E66.813 CLASS 3 SEVERE OBESITY WITHOUT SERIOUS COMORBIDITY WITH BODY MASS INDEX (BMI) OF 40.0 TO 44.9 IN ADULT, UNSPECIFIED OBESITY TYPE (HCC): ICD-10-CM

## 2025-03-19 LAB — HEMOGLOBIN A1C: 5.5 % (ref 4.3–5.6)

## 2025-03-19 PROCEDURE — 90715 TDAP VACCINE 7 YRS/> IM: CPT | Performed by: PHYSICIAN ASSISTANT

## 2025-03-19 PROCEDURE — 90471 IMMUNIZATION ADMIN: CPT | Performed by: PHYSICIAN ASSISTANT

## 2025-03-19 PROCEDURE — 99397 PER PM REEVAL EST PAT 65+ YR: CPT | Performed by: PHYSICIAN ASSISTANT

## 2025-03-19 PROCEDURE — 83036 HEMOGLOBIN GLYCOSYLATED A1C: CPT | Performed by: PHYSICIAN ASSISTANT

## 2025-03-19 RX ORDER — AMLODIPINE BESYLATE 10 MG/1
10 TABLET ORAL DAILY
Qty: 90 TABLET | Refills: 3 | Status: SHIPPED | OUTPATIENT
Start: 2025-03-19

## (undated) NOTE — MR AVS SNAPSHOT
After Visit Summary   3/17/2021    Noelle Juarez    MRN: [id number]           Visit Information     Date & Time  3/17/2021  1:30 PM Provider  Johnathon Schulz MD Department  [address] [place] [address] Dept.  Phone  [phone]      Allergies as [facility] physician or   CODIE using your mobile device or computer   using [employer].    e-VISITS  Communicate with a [facility] Physician or [facility]ODIE online. The physician will respond and provide   a treatment plan within a few hours.  ONLINE VISIT  Primary Care Providers  Tr

## (undated) NOTE — ED AVS SNAPSHOT
Sahrlene Beal   MRN: [id number]    Department:  [facility] Emergency Department   Date of Visit:  1/22/2019           Disclosure     Insurance plans vary and the physician(s) referred by the ER may not be covered by your plan.  Please contact y tell this physician (or your personal doctor if your instructions are to return to your personal doctor) about any new or lasting problems. The primary care or specialist physician will see patients referred from the [facility] Emergency Department.  Andrew Walker

## (undated) NOTE — LETTER
October 27, 2017    [address] [place] [address] Jean Marie Schroeder [place] [id number]      Dear Bettyjane Dakin: The following are the results of your recent tests. Please review the list of test results.   Your result is the value on the left; we have also gutierrez Dandy Martines RN